# Patient Record
Sex: MALE | URBAN - METROPOLITAN AREA
[De-identification: names, ages, dates, MRNs, and addresses within clinical notes are randomized per-mention and may not be internally consistent; named-entity substitution may affect disease eponyms.]

---

## 2022-03-07 ENCOUNTER — EMERGENCY (EMERGENCY)
Facility: HOSPITAL | Age: 52
LOS: 1 days | Discharge: HOME | End: 2022-03-07
Attending: EMERGENCY MEDICINE
Payer: MEDICAID

## 2022-03-07 VITALS
HEIGHT: 68 IN | OXYGEN SATURATION: 98 % | WEIGHT: 199.96 LBS | RESPIRATION RATE: 18 BRPM | SYSTOLIC BLOOD PRESSURE: 168 MMHG | TEMPERATURE: 98 F | HEART RATE: 62 BPM | DIASTOLIC BLOOD PRESSURE: 79 MMHG

## 2022-03-07 VITALS
DIASTOLIC BLOOD PRESSURE: 77 MMHG | SYSTOLIC BLOOD PRESSURE: 157 MMHG | HEART RATE: 68 BPM | OXYGEN SATURATION: 98 % | TEMPERATURE: 98 F | RESPIRATION RATE: 18 BRPM

## 2022-03-07 LAB
HCT VFR BLD CALC: 41.4 % — SIGNIFICANT CHANGE UP (ref 39–50)
HGB BLD-MCNC: 14.3 G/DL — SIGNIFICANT CHANGE UP (ref 13–17)
LIDOCAIN IGE QN: 65 U/L — HIGH (ref 7–60)
MCHC RBC-ENTMCNC: 30.8 PG — SIGNIFICANT CHANGE UP (ref 27–34)
MCHC RBC-ENTMCNC: 34.5 GM/DL — SIGNIFICANT CHANGE UP (ref 32–36)
MCV RBC AUTO: 89 FL — SIGNIFICANT CHANGE UP (ref 80–100)
NRBC # BLD: 0 /100 WBCS — SIGNIFICANT CHANGE UP (ref 0–0)
PLATELET # BLD AUTO: 429 K/UL — HIGH (ref 150–400)
RBC # BLD: 4.65 M/UL — SIGNIFICANT CHANGE UP (ref 4.2–5.8)
RBC # FLD: 13 % — SIGNIFICANT CHANGE UP (ref 10.3–14.5)
WBC # BLD: 8.26 K/UL — SIGNIFICANT CHANGE UP (ref 3.8–10.5)
WBC # FLD AUTO: 8.26 K/UL — SIGNIFICANT CHANGE UP (ref 3.8–10.5)

## 2022-03-07 PROCEDURE — 76705 ECHO EXAM OF ABDOMEN: CPT | Mod: 26,RT

## 2022-03-07 PROCEDURE — 99284 EMERGENCY DEPT VISIT MOD MDM: CPT

## 2022-03-07 RX ORDER — ONDANSETRON 8 MG/1
4 TABLET, FILM COATED ORAL ONCE
Refills: 0 | Status: DISCONTINUED | OUTPATIENT
Start: 2022-03-07 | End: 2022-03-11

## 2022-03-07 NOTE — ED PROVIDER NOTE - NSFOLLOWUPCLINICS_GEN_ALL_ED_FT
[facility]  General Surgery  [address] [place] [address] - 3rd [place] [address]  Phone: [phone]  Fax:

## 2022-03-07 NOTE — ED ADULT NURSE NOTE - NSIMPLEMENTINTERV_GEN_ALL_ED
Implemented All Universal Safety Interventions:  [place] to call system. Call bell, personal items and telephone within reach. Instruct patient to call for assistance. Room bathroom lighting operational. Non-slip footwear when patient is off stretcher. Physically safe environment: no spills, clutter or unnecessary equipment. Stretcher in lowest position, wheels locked, appropriate side rails in place.

## 2022-03-07 NOTE — ED PROVIDER NOTE - ATTENDING CONTRIBUTION TO CARE
attending Abimbola: 51yM h/o cholelithiasis presents with worsening RUQ pain today. Reports having had outpatient MRI this week showing gallstones. Nausea without vomiting, chills without fever. Exam as above. Will obtain labs including lipase, US eval gallbladder, symptomatic treatment, reassess.

## 2022-03-07 NOTE — ED PROVIDER NOTE - RAPID ASSESSMENT
51 M w/ PMHx of gallbladder stones c/o abdominal pain, nausea, and chills. NKDA.    **Pt seen in the waiting room via teletriage by Roberto Nazario (MD), documentation completed by Hebert Florence. Pt to be sent to main ED for further evaluation - all orders placed to be followed by MD in the main ED**  Scribe Statement: Jazmyne WOLF Cole (scribe), attest that this documentation has been prepared under the direction and in the presence of Roberto Nazario (MD) 51 M w/ PMHx of gallbladder stones c/o abdominal pain, nausea, and chills. NKDA.    **Pt seen in the waiting room via teletriage by Roberto Nazario (MD), documentation completed by Jo Ann -Hebert Samano. Pt to be sent to main ED for further evaluation - all orders placed to be followed by MD in the main ED**  Scribe Statement: I, Hebert Samano (scribe), attest that this documentation has been prepared under the direction and in the presence of Roberto Nazario (MD)      Attending Note --     I saw the patient waiting area via televideo connection; a brief history was taken and a thorough physical exam was not performed as there is no physical exam room available.  The patient will be seen and further worked up in the main emergency department and their care will be completed by the main emergency department team.  I was not involved in this patient's care during the QDOC process, and unless otherwise noted in the ED provider note, was not involved in their care during their ED course.    The yohannesibe's documentation has been prepared under my direction and personally reviewed by me in its entirety. I confirm that the note above accurately reflects all work, treatment, procedures, and medical decision making performed by me  Dr. Nazario

## 2022-03-07 NOTE — ED PROVIDER NOTE - OBJECTIVE STATEMENT
PRINCIPAL DISCHARGE DIAGNOSIS  Diagnosis: Dizziness  Assessment and Plan of Treatment: You had a Cat scan of your head which did not show anything abnormal. Please see Dr. Abarca within 1 week for further follow-up.   If you continue to have symptoms, please call your doctor right away.      SECONDARY DISCHARGE DIAGNOSES  Diagnosis: Essential hypertension  Assessment and Plan of Treatment: Essential hypertension  - Continue current medications that you are taking at home.   - Monitor your blood pressure at home.
51M pmh cholelithiasis presnts to ED for RUQ pain radiating to R chest, symptoms have been going on for approx 4 weeks, pt recently had outpt MRI a few days ago which showed cholelithiasis, pt endoresed some nausea no vomiting, no fevers, abd pain improving, pt states symptoms have significantly approved, pt not currently nauseous or in any pain.

## 2022-03-07 NOTE — ED PROVIDER NOTE - PATIENT PORTAL LINK FT
You can access the FollowMyHealth Patient Portal offered by [facility] by registering at the following website: [email or web address]. By joining [employer]’s FollowMyHealth portal, you will also be able to view your health information using other applications (apps) compatible with our system.

## 2022-03-07 NOTE — ED ADULT NURSE NOTE - OBJECTIVE STATEMENT
52yo M with PMH recent gallstone dx, presents to ED with worsening abdominal pain. Patient states, "I saw my Dr. and was diagnosed with multiple gallstones, he wants to schedule a procedure on 3/21, but the pain is getting worse so I came to the ER". Endorses RUQ pain, denies taking anything for the pain. Also notes nausea associated with the pain and episodes of diarrhea. Denies chest pain, SOB, vomiting, bloody stools, constipation, hematuria, dysuria, urinary frequency, fevers/chills. A&Ox4. Strong peripheral pulses. Abdomen soft, nondistended, nontender to palpation. Negative CVA tenderness. Ambulatory with steady gait in ED. Stretcher in lowest position, side rails up. Patient instructed to notify RN if assistance is needed.

## 2022-03-07 NOTE — ED PROVIDER NOTE - NSFOLLOWUPINSTRUCTIONS_ED_ALL_ED_FT
You were seen in the Emergency Department for abdominal pain.    Follow up with your primary care provider within 3-5 days.    Follow up with a surgeon within the week, their information has been provided for you.    You may take 650 mg Tylenol (acetaminophen) every eight hours as needed for pain.    You may take 600mg Ibuprofen (Advil) once every 8 hours as needed for pain. See medication label for warnings and use instructions.    If you have worsening symptoms, fever, chills, nausea, vomiting, new or worsening pain, or if you have any new symptoms return to the Emergency Department.

## 2022-03-07 NOTE — ED PROVIDER NOTE - CLINICAL SUMMARY MEDICAL DECISION MAKING FREE TEXT BOX
Likely cholelithiasis, low concern for cholecystitis, not concerned for cholangitis, low concern nephrolithiasis ua r/o stone, symptomatic control, likely DC w. output followup.

## 2022-03-07 NOTE — ED PROVIDER NOTE - PROGRESS NOTE DETAILS
attending Abimbola: pt made aware of finding of possible hemangioma on US. Reports having had outpatient MRI several days ago and was unaware of any liver findings on that study. Pt aware he needs to have CT imaging performed outpatient.

## 2022-03-07 NOTE — ED PROVIDER NOTE - PHYSICAL EXAMINATION
CONSTITUTIONAL: well-appearing, in NAD  SKIN: Warm dry, normal skin turgor  HEAD: NCAT  NECK: Supple; non tender. Full ROM.  CARD: RRR, no murmurs.  RESP: clear to ausculation b/l. No crackles or wheezing.  ABD: soft, non-tender, non-distended, no rebound or guarding, neg murphys, no cva tenderness  MSK: No pedal edema, no calf tenderness  PSYCH: Cooperative, appropriate.

## 2022-03-08 LAB
ALBUMIN SERPL ELPH-MCNC: 4.2 G/DL — SIGNIFICANT CHANGE UP (ref 3.3–5)
ALP SERPL-CCNC: 120 U/L — SIGNIFICANT CHANGE UP (ref 40–120)
ALT FLD-CCNC: 36 U/L — SIGNIFICANT CHANGE UP (ref 10–45)
ANION GAP SERPL CALC-SCNC: 16 MMOL/L — SIGNIFICANT CHANGE UP (ref 5–17)
APPEARANCE UR: CLEAR — SIGNIFICANT CHANGE UP
AST SERPL-CCNC: 18 U/L — SIGNIFICANT CHANGE UP (ref 10–40)
BILIRUB SERPL-MCNC: 0.4 MG/DL — SIGNIFICANT CHANGE UP (ref 0.2–1.2)
BILIRUB UR-MCNC: NEGATIVE — SIGNIFICANT CHANGE UP
BUN SERPL-MCNC: 11 MG/DL — SIGNIFICANT CHANGE UP (ref 7–23)
CALCIUM SERPL-MCNC: 9.1 MG/DL — SIGNIFICANT CHANGE UP (ref 8.4–10.5)
CHLORIDE SERPL-SCNC: 103 MMOL/L — SIGNIFICANT CHANGE UP (ref 96–108)
CO2 SERPL-SCNC: 20 MMOL/L — LOW (ref 22–31)
COLOR SPEC: COLORLESS — SIGNIFICANT CHANGE UP
CREAT SERPL-MCNC: 1.02 MG/DL — SIGNIFICANT CHANGE UP (ref 0.5–1.3)
DIFF PNL FLD: NEGATIVE — SIGNIFICANT CHANGE UP
EGFR: 89 ML/MIN/1.73M2 — SIGNIFICANT CHANGE UP
GLUCOSE SERPL-MCNC: 120 MG/DL — HIGH (ref 70–99)
GLUCOSE UR QL: NEGATIVE — SIGNIFICANT CHANGE UP
KETONES UR-MCNC: ABNORMAL
LEUKOCYTE ESTERASE UR-ACNC: NEGATIVE — SIGNIFICANT CHANGE UP
NITRITE UR-MCNC: NEGATIVE — SIGNIFICANT CHANGE UP
PH UR: 6 — SIGNIFICANT CHANGE UP (ref 5–8)
POTASSIUM SERPL-MCNC: 3.7 MMOL/L — SIGNIFICANT CHANGE UP (ref 3.5–5.3)
POTASSIUM SERPL-SCNC: 3.7 MMOL/L — SIGNIFICANT CHANGE UP (ref 3.5–5.3)
PROT SERPL-MCNC: 7.1 G/DL — SIGNIFICANT CHANGE UP (ref 6–8.3)
PROT UR-MCNC: NEGATIVE — SIGNIFICANT CHANGE UP
SODIUM SERPL-SCNC: 138 MMOL/L — SIGNIFICANT CHANGE UP (ref 135–145)
SP GR SPEC: 1 — LOW (ref 1.01–1.02)
UROBILINOGEN FLD QL: NEGATIVE — SIGNIFICANT CHANGE UP

## 2022-03-08 PROCEDURE — 83690 ASSAY OF LIPASE: CPT

## 2022-03-08 PROCEDURE — 80053 COMPREHEN METABOLIC PANEL: CPT

## 2022-03-08 PROCEDURE — 96374 THER/PROPH/DIAG INJ IV PUSH: CPT

## 2022-03-08 PROCEDURE — 81003 URINALYSIS AUTO W/O SCOPE: CPT

## 2022-03-08 PROCEDURE — 99284 EMERGENCY DEPT VISIT MOD MDM: CPT | Mod: 25

## 2022-03-08 PROCEDURE — 36415 COLL VENOUS BLD VENIPUNCTURE: CPT

## 2022-03-08 PROCEDURE — 76705 ECHO EXAM OF ABDOMEN: CPT

## 2022-03-08 PROCEDURE — 85027 COMPLETE CBC AUTOMATED: CPT

## 2022-03-08 RX ORDER — FAMOTIDINE 10 MG/ML
20 INJECTION INTRAVENOUS ONCE
Refills: 0 | Status: COMPLETED | OUTPATIENT
Start: 2022-03-08 | End: 2022-03-08

## 2022-03-08 RX ADMIN — Medication 30 MILLILITER(S): at 00:32

## 2022-03-08 RX ADMIN — FAMOTIDINE 20 MILLIGRAM(S): 10 INJECTION INTRAVENOUS at 00:32

## 2022-04-25 PROBLEM — Z00.00 ENCOUNTER FOR PREVENTIVE HEALTH EXAMINATION: Status: ACTIVE | Noted: 2022-04-25

## 2022-05-07 ENCOUNTER — INPATIENT (INPATIENT)
Facility: HOSPITAL | Age: 52
LOS: 5 days | Discharge: HOME | DRG: 418 | End: 2022-05-13
Attending: SURGERY | Admitting: SURGERY
Payer: COMMERCIAL

## 2022-05-07 VITALS
DIASTOLIC BLOOD PRESSURE: 92 MMHG | HEIGHT: 68 IN | OXYGEN SATURATION: 100 % | RESPIRATION RATE: 22 BRPM | WEIGHT: 175.05 LBS | TEMPERATURE: 97 F | HEART RATE: 67 BPM | SYSTOLIC BLOOD PRESSURE: 164 MMHG

## 2022-05-07 DIAGNOSIS — K81.9 CHOLECYSTITIS, UNSPECIFIED: ICD-10-CM

## 2022-05-07 LAB
ALBUMIN SERPL ELPH-MCNC: 4.6 G/DL — SIGNIFICANT CHANGE UP (ref 3.3–5)
ALP SERPL-CCNC: 186 U/L — HIGH (ref 40–120)
ALT FLD-CCNC: 583 U/L — HIGH (ref 10–45)
ANION GAP SERPL CALC-SCNC: 16 MMOL/L — SIGNIFICANT CHANGE UP (ref 5–17)
APTT BLD: 30.2 SEC — SIGNIFICANT CHANGE UP (ref 27.5–35.5)
AST SERPL-CCNC: 681 U/L — HIGH (ref 10–40)
BASOPHILS # BLD AUTO: 0.03 K/UL — SIGNIFICANT CHANGE UP (ref 0–0.2)
BASOPHILS NFR BLD AUTO: 0.4 % — SIGNIFICANT CHANGE UP (ref 0–2)
BILIRUB SERPL-MCNC: 2.4 MG/DL — HIGH (ref 0.2–1.2)
BLD GP AB SCN SERPL QL: NEGATIVE — SIGNIFICANT CHANGE UP
BUN SERPL-MCNC: 14 MG/DL — SIGNIFICANT CHANGE UP (ref 7–23)
CALCIUM SERPL-MCNC: 9.5 MG/DL — SIGNIFICANT CHANGE UP (ref 8.4–10.5)
CHLORIDE SERPL-SCNC: 100 MMOL/L — SIGNIFICANT CHANGE UP (ref 96–108)
CO2 SERPL-SCNC: 23 MMOL/L — SIGNIFICANT CHANGE UP (ref 22–31)
CREAT SERPL-MCNC: 0.96 MG/DL — SIGNIFICANT CHANGE UP (ref 0.5–1.3)
EGFR: 96 ML/MIN/1.73M2 — SIGNIFICANT CHANGE UP
EOSINOPHIL # BLD AUTO: 0 K/UL — SIGNIFICANT CHANGE UP (ref 0–0.5)
EOSINOPHIL NFR BLD AUTO: 0 % — SIGNIFICANT CHANGE UP (ref 0–6)
FLUAV AG NPH QL: SIGNIFICANT CHANGE UP
FLUBV AG NPH QL: SIGNIFICANT CHANGE UP
GAS PNL BLDV: SIGNIFICANT CHANGE UP
GLUCOSE SERPL-MCNC: 122 MG/DL — HIGH (ref 70–99)
HCT VFR BLD CALC: 41.9 % — SIGNIFICANT CHANGE UP (ref 39–50)
HGB BLD-MCNC: 14.2 G/DL — SIGNIFICANT CHANGE UP (ref 13–17)
IMM GRANULOCYTES NFR BLD AUTO: 0.2 % — SIGNIFICANT CHANGE UP (ref 0–1.5)
INR BLD: 1 RATIO — SIGNIFICANT CHANGE UP (ref 0.88–1.16)
LIDOCAIN IGE QN: 22 U/L — SIGNIFICANT CHANGE UP (ref 7–60)
LYMPHOCYTES # BLD AUTO: 1.1 K/UL — SIGNIFICANT CHANGE UP (ref 1–3.3)
LYMPHOCYTES # BLD AUTO: 13.6 % — SIGNIFICANT CHANGE UP (ref 13–44)
MCHC RBC-ENTMCNC: 30.6 PG — SIGNIFICANT CHANGE UP (ref 27–34)
MCHC RBC-ENTMCNC: 33.9 GM/DL — SIGNIFICANT CHANGE UP (ref 32–36)
MCV RBC AUTO: 90.3 FL — SIGNIFICANT CHANGE UP (ref 80–100)
MONOCYTES # BLD AUTO: 0.54 K/UL — SIGNIFICANT CHANGE UP (ref 0–0.9)
MONOCYTES NFR BLD AUTO: 6.7 % — SIGNIFICANT CHANGE UP (ref 2–14)
NEUTROPHILS # BLD AUTO: 6.37 K/UL — SIGNIFICANT CHANGE UP (ref 1.8–7.4)
NEUTROPHILS NFR BLD AUTO: 79.1 % — HIGH (ref 43–77)
NRBC # BLD: 0 /100 WBCS — SIGNIFICANT CHANGE UP (ref 0–0)
PLATELET # BLD AUTO: 239 K/UL — SIGNIFICANT CHANGE UP (ref 150–400)
POTASSIUM SERPL-MCNC: 3.9 MMOL/L — SIGNIFICANT CHANGE UP (ref 3.5–5.3)
POTASSIUM SERPL-SCNC: 3.9 MMOL/L — SIGNIFICANT CHANGE UP (ref 3.5–5.3)
PROT SERPL-MCNC: 7.5 G/DL — SIGNIFICANT CHANGE UP (ref 6–8.3)
PROTHROM AB SERPL-ACNC: 11.6 SEC — SIGNIFICANT CHANGE UP (ref 10.5–13.4)
RBC # BLD: 4.64 M/UL — SIGNIFICANT CHANGE UP (ref 4.2–5.8)
RBC # FLD: 13.6 % — SIGNIFICANT CHANGE UP (ref 10.3–14.5)
RH IG SCN BLD-IMP: POSITIVE — SIGNIFICANT CHANGE UP
RH IG SCN BLD-IMP: POSITIVE — SIGNIFICANT CHANGE UP
RSV RNA NPH QL NAA+NON-PROBE: SIGNIFICANT CHANGE UP
SARS-COV-2 RNA SPEC QL NAA+PROBE: SIGNIFICANT CHANGE UP
SODIUM SERPL-SCNC: 139 MMOL/L — SIGNIFICANT CHANGE UP (ref 135–145)
WBC # BLD: 8.06 K/UL — SIGNIFICANT CHANGE UP (ref 3.8–10.5)
WBC # FLD AUTO: 8.06 K/UL — SIGNIFICANT CHANGE UP (ref 3.8–10.5)

## 2022-05-07 PROCEDURE — 99284 EMERGENCY DEPT VISIT MOD MDM: CPT | Mod: 25

## 2022-05-07 PROCEDURE — 93010 ELECTROCARDIOGRAM REPORT: CPT

## 2022-05-07 PROCEDURE — 76705 ECHO EXAM OF ABDOMEN: CPT | Mod: 26

## 2022-05-07 RX ORDER — SODIUM CHLORIDE 9 MG/ML
1000 INJECTION, SOLUTION INTRAVENOUS ONCE
Refills: 0 | Status: COMPLETED | OUTPATIENT
Start: 2022-05-07 | End: 2022-05-07

## 2022-05-07 RX ORDER — SODIUM CHLORIDE 9 MG/ML
1000 INJECTION, SOLUTION INTRAVENOUS
Refills: 0 | Status: DISCONTINUED | OUTPATIENT
Start: 2022-05-07 | End: 2022-05-10

## 2022-05-07 RX ORDER — ERTAPENEM SODIUM 1 G/1
1000 INJECTION, POWDER, LYOPHILIZED, FOR SOLUTION INTRAMUSCULAR; INTRAVENOUS ONCE
Refills: 0 | Status: COMPLETED | OUTPATIENT
Start: 2022-05-07 | End: 2022-05-07

## 2022-05-07 RX ORDER — HYDROMORPHONE HYDROCHLORIDE 2 MG/ML
0.5 INJECTION INTRAMUSCULAR; INTRAVENOUS; SUBCUTANEOUS EVERY 6 HOURS
Refills: 0 | Status: DISCONTINUED | OUTPATIENT
Start: 2022-05-07 | End: 2022-05-10

## 2022-05-07 RX ORDER — ERTAPENEM SODIUM 1 G/1
INJECTION, POWDER, LYOPHILIZED, FOR SOLUTION INTRAMUSCULAR; INTRAVENOUS
Refills: 0 | Status: DISCONTINUED | OUTPATIENT
Start: 2022-05-07 | End: 2022-05-11

## 2022-05-07 RX ORDER — ERTAPENEM SODIUM 1 G/1
1000 INJECTION, POWDER, LYOPHILIZED, FOR SOLUTION INTRAMUSCULAR; INTRAVENOUS EVERY 24 HOURS
Refills: 0 | Status: DISCONTINUED | OUTPATIENT
Start: 2022-05-08 | End: 2022-05-11

## 2022-05-07 RX ORDER — MORPHINE SULFATE 50 MG/1
2 CAPSULE, EXTENDED RELEASE ORAL ONCE
Refills: 0 | Status: DISCONTINUED | OUTPATIENT
Start: 2022-05-07 | End: 2022-05-07

## 2022-05-07 RX ORDER — ENOXAPARIN SODIUM 100 MG/ML
40 INJECTION SUBCUTANEOUS EVERY 24 HOURS
Refills: 0 | Status: DISCONTINUED | OUTPATIENT
Start: 2022-05-07 | End: 2022-05-11

## 2022-05-07 RX ORDER — PIPERACILLIN AND TAZOBACTAM 4; .5 G/20ML; G/20ML
3.38 INJECTION, POWDER, LYOPHILIZED, FOR SOLUTION INTRAVENOUS ONCE
Refills: 0 | Status: COMPLETED | OUTPATIENT
Start: 2022-05-07 | End: 2022-05-07

## 2022-05-07 RX ORDER — SODIUM CHLORIDE 9 MG/ML
1000 INJECTION INTRAMUSCULAR; INTRAVENOUS; SUBCUTANEOUS ONCE
Refills: 0 | Status: COMPLETED | OUTPATIENT
Start: 2022-05-07 | End: 2022-05-07

## 2022-05-07 RX ORDER — MORPHINE SULFATE 50 MG/1
4 CAPSULE, EXTENDED RELEASE ORAL ONCE
Refills: 0 | Status: DISCONTINUED | OUTPATIENT
Start: 2022-05-07 | End: 2022-05-07

## 2022-05-07 RX ORDER — FAMOTIDINE 10 MG/ML
20 INJECTION INTRAVENOUS ONCE
Refills: 0 | Status: COMPLETED | OUTPATIENT
Start: 2022-05-07 | End: 2022-05-07

## 2022-05-07 RX ADMIN — PIPERACILLIN AND TAZOBACTAM 200 GRAM(S): 4; .5 INJECTION, POWDER, LYOPHILIZED, FOR SOLUTION INTRAVENOUS at 18:00

## 2022-05-07 RX ADMIN — ERTAPENEM SODIUM 120 MILLIGRAM(S): 1 INJECTION, POWDER, LYOPHILIZED, FOR SOLUTION INTRAMUSCULAR; INTRAVENOUS at 20:53

## 2022-05-07 RX ADMIN — SODIUM CHLORIDE 1000 MILLILITER(S): 9 INJECTION, SOLUTION INTRAVENOUS at 15:32

## 2022-05-07 RX ADMIN — SODIUM CHLORIDE 100 MILLILITER(S): 9 INJECTION, SOLUTION INTRAVENOUS at 21:26

## 2022-05-07 RX ADMIN — SODIUM CHLORIDE 1000 MILLILITER(S): 9 INJECTION INTRAMUSCULAR; INTRAVENOUS; SUBCUTANEOUS at 18:00

## 2022-05-07 RX ADMIN — FAMOTIDINE 20 MILLIGRAM(S): 10 INJECTION INTRAVENOUS at 15:32

## 2022-05-07 NOTE — ED ADULT NURSE REASSESSMENT NOTE - NS ED NURSE REASSESS COMMENT FT1
Report received from ANNE Pabon. Pt resting in stretcher, a&ox3, nad, breathing spontaneous and unlabored, able to move all extremities and follow commands, skin warm and appropriate color. Pt reports mild abdominal pain but denies pain medication at this time. Pt safety and comfort provided.

## 2022-05-07 NOTE — H&P ADULT - ASSESSMENT
51M presenting with clinical acute cholecystitis and choledocholithiasis.    PLAN:  - Admit to ACS under Dr. Weems.  - MRCP to assess for choledocholithiasis  - NPO/IVFs  - IV Abx w/ Ertapenem  - Pain control as needed  - AM labs including LFTs  - Possible GI consult in AM if patient with persistent transaminitis/hyperbilirubinemia and/or MRCP significant for choledocholithiasis    Discussed with attending surgeon Dr. Faustina Fabian, PGY-2   [facility]   Acute Care Surgery   [id number]

## 2022-05-07 NOTE — ED PROVIDER NOTE - OBJECTIVE STATEMENT
50 yo male w/ PMH cholelithiasis here with c/o constant, worsening RUQ abd pain x 1 week. Pain started at night when trying to sleep, has been contast with associated nausea, worse with moving and with PO intake. has been tolerating fruit, states pain has been worsening. Pt has been taking motrin and tylenol with no relief, tool tylenol this morning what caused him to vomit. No fevers, chills, diarrhea, constipation, diarrhea, cough, chest pain.

## 2022-05-07 NOTE — ED PROVIDER NOTE - PHYSICAL EXAMINATION
A&Ox3, NAD, well appearing  NCAT. PERRL, EOMI.  Neck supple, no LAD.   Lungs CTAB. No w/r/r  Cardiac +S1S2, RRR, No m/r/g.   Abd soft, + RUQ TTP, +BS, no rebound or guarding.   Extremities: cap refill <2, pulses in distal extremities 4+, no edema.   Skin without rash.   No focal Defecits

## 2022-05-07 NOTE — ED PROVIDER NOTE - NS ED ATTENDING STATEMENT MOD
This was a shared visit with the SHAYNA. I reviewed and verified the documentation and independently performed the documented:

## 2022-05-07 NOTE — ED ADULT NURSE REASSESSMENT NOTE - NS ED NURSE REASSESS COMMENT FT1
Pt declined morphine as ordered by provider and in EMAR. PA made aware. Pt educated to make RN aware if patient experiencing pain.

## 2022-05-07 NOTE — H&P ADULT - NSHPLABSRESULTS_GEN_ALL_CORE
LABS:                        14.2   8.06  )-----------( 239      ( 07 May 2022 15:57 )             41.9     07 May 2022 15:57    139    |  100    |  14     ----------------------------<  122    3.9     |  23     |  0.96     Ca    9.5        07 May 2022 15:57    TPro  7.5    /  Alb  4.6    /  TBili  2.4    /  DBili  x      /  AST  681    /  ALT  583    /  AlkPhos  186    07 May 2022 15:57    PT/INR - ( 07 May 2022 15:57 )   PT: 11.6 sec;   INR: 1.00 ratio    PTT - ( 07 May 2022 15:57 )  PTT:30.2 sec    LIVER FUNCTIONS - ( 07 May 2022 15:57 )  Alb: 4.6 g/dL / Pro: 7.5 g/dL / ALK PHOS: 186 U/L / ALT: 583 U/L / AST: 681 U/L / GGT: x           IMAGING:    US Abdomen Upper Quadrant Right (05.07.22 @ 16:50)    FINDINGS:  Liver: Mildly hyperechoic lesion in the left hepatic lobe measuring 1.7 x   1.2 x 1.6 cm.  Bile ducts: Mild biliary ductal dilatation. Common bile duct measures 11   mm.  Gallbladder: Cholelithiasis. Mild thickening of the gallbladder wall. No   pericholecystic fluid. Localized tenderness was elicited while scanning   over the gallbladder.  Pancreas: Poorly visualized.  Right kidney: 11.3 cm. No hydronephrosis.  Ascites: None.  IVC: Visualized portions are within normal limits.    IMPRESSION:  *  Cholelithiasis with additional findings concerning for acute   cholecystitis.  *  Biliary ductal dilatation. CBD measures up to 11 mm.  *  Indeterminate mildly hyperechoic lesion in the left hepatic lobe   measuring up to 1.7 cm. Lesion may be further evaluated with   contrast-enhanced abdominal MRI on a nonemergent basis.

## 2022-05-07 NOTE — ED PROVIDER NOTE - NS ED ROS FT
Constitutional: No fever or chills  CV: No chest pain  Resp: No SOB  GI: see hpi  : No dysuria, hematuria.

## 2022-05-07 NOTE — H&P ADULT - REASON FOR ADMISSION
Anticoagulation Episode Summary     Current INR goal:   2.0-3.0   TTR:   73.3 % (5.7 y)   Next INR check:   8/20/2019   INR from last check:      Weekly max warfarin dose:      Target end date:   Indefinite   INR check location:      Preferred lab:      Send INR reminders to:   [facility] (OPEN ENROLLMENT) [place]    Indications    Warfarin anticoagulation [Z79.01]  Atrial Fibrillation  paroxysmal [I48.91]  Long term (current) use of anticoagulants [Z79.01] [Z79.01]           Comments:   2/72019   >75  HTn  HL  CAD  endocarditis     had been on lovenox for procedure - check with Eduard if need it         Anticoagulation Care Providers     Provider Role Specialty Phone number    Gt Chapa MD Referring Internal Medicine [phone]      Patient declines any positive findings.     Notified patient of results/note as listed below.jose        
INR = 3.0   Reduce coumadin to 5 mg M, and 6 mg all other days.  Repeat INR in 2 weeks.   
Clinical acute cholecystitis w/ choledocholithiasis

## 2022-05-07 NOTE — ED PROVIDER NOTE - CLINICAL SUMMARY MEDICAL DECISION MAKING FREE TEXT BOX
ROBERT Dsouza MD:  52 yo male w/ PMH cholelithiasis here with c/o constant, worsening RUQ abd pain x 1 week. Plan for: RUQ US, basic labs, lipase, IVF, pain control, reassess

## 2022-05-07 NOTE — ED ADULT NURSE NOTE - OBJECTIVE STATEMENT
50 yo M w/ PMHx of cholelithiasis presents to ED via waiting room c/o RUQ pain. Pt reports pain similar to prior episode of cholelithiasis. Pt reports pain began this morning after eating. Took tylenol w/ minor relief of pain. Pt denies any CP, SOB, cough, N/V, fever, chills, urinary complaints, constipation, diarrhea, HA, dizziness, weakness. Pt A&Ox4, lungs CTA, +central pulses. Abdomen soft, not distended. Ambulating w/ steady gait, safety and comfort maintained, no acute distress noted at this time. Pt denies any recent travel or known sick contacts. 50 yo M w/ PMHx of cholelithiasis presents to ED via waiting room c/o RUQ pain. Pt reports pain similar to prior episode of cholelithiasis, pain persistent over past week. Pt reports pain worsened this morning after eating. Took tylenol w/ minor relief of pain. Pt denies any CP, SOB, cough, N/V, fever, chills, urinary complaints, constipation, diarrhea, HA, dizziness, weakness. Pt A&Ox4, lungs CTA, +central pulses. Abdomen soft, not distended. Ambulating w/ steady gait, safety and comfort maintained, no acute distress noted at this time. Pt denies any recent travel or known sick contacts.

## 2022-05-07 NOTE — H&P ADULT - ATTENDING COMMENTS
Pt seen and examined with ACS team, agree with above. Pt has h/o cholelithiasis and has cholecystectomy scheduled in June, but this most recent episode of pain has lasted a week and has been associated with nausea and vomiting. Has improved with pain medication.    VSS. Abd soft, nondistended, mild TTP RUQ (pt had recently received pain medication and stated he was feeling better).    Labs reviewed, notable for total bili 2.4.  US images personally reviewed, demonstrates cholelithiasis and dilation of the CBD.    A/P 52y/o man with h/o cholelithiasis presents with acute cholecystitis and possible choledocholithiasis:  - MRCP to evaluate for choledocholithiasis  - Acute mady diagnosed clinically with 1 week of pain - start antibiotics  - Will likely proceed to cholecystectomy during this admission given frequent recurrence of symptoms.  - NPO, IVF.

## 2022-05-07 NOTE — PATIENT PROFILE ADULT - DO YOU FEEL UNSAFE AT HOME, WORK, OR SCHOOL?
Pt called stating he has an appt 6-22-17  Pt is requesting a refill of      Disp Refills Start End      glimepiride (AMARYL) 2 MG tablet 90 tablet 1 10/26/2016     Sig - Route: Take 1 tablet by mouth daily (before breakfast).      [facility]  Per protocol medication refilled.     no

## 2022-05-07 NOTE — H&P ADULT - NSHPPHYSICALEXAM_GEN_ALL_CORE
VITAL SIGNS:  Vital Signs Last 24 Hrs  T(C): 37.3 (07 May 2022 15:16), Max: 37.3 (07 May 2022 13:50)  T(F): 99.1 (07 May 2022 15:16), Max: 99.2 (07 May 2022 13:50)  HR: 78 (07 May 2022 15:16) (67 - 78)  BP: 154/96 (07 May 2022 15:16) (151/82 - 164/92)  BP(mean): --  RR: 20 (07 May 2022 15:16) (20 - 22)  SpO2: 98% (07 May 2022 15:16) (98% - 100%)      PHYSICAL EXAM:    General: NAD, Sitting in bed comfortably  HEENT: NC/AT, EOMI  Neck: Soft, supple  Cardio: RRR, nml S1/S2  Resp: Good effort, CTA b/l  Thorax: No chest wall tenderness  Breast: No lesions/masses, no drainage  GI/Abd: Soft, RUQ tenderness, no rebound/guarding, no masses palpated  Musculoskeletal: All 4 extremities moving spontaneously, no limitations VITAL SIGNS:  Vital Signs Last 24 Hrs  T(C): 37.3 (07 May 2022 15:16), Max: 37.3 (07 May 2022 13:50)  T(F): 99.1 (07 May 2022 15:16), Max: 99.2 (07 May 2022 13:50)  HR: 78 (07 May 2022 15:16) (67 - 78)  BP: 154/96 (07 May 2022 15:16) (151/82 - 164/92)  BP(mean): --  RR: 20 (07 May 2022 15:16) (20 - 22)  SpO2: 98% (07 May 2022 15:16) (98% - 100%)      PHYSICAL EXAM:    General: NAD, Sitting in bed comfortably  HENT: NC/AT  Eyes: EOMI, no scleral icterus  Neck: Soft, supple  Cardio: RRR, nml S1/S2  Resp: Good effort, CTA b/l  Thorax: No chest wall tenderness  GI/Abd: Soft, RUQ tenderness, no rebound/guarding, no masses palpated  Musculoskeletal: All 4 extremities moving spontaneously, no limitations  Neuro: Moves all extremities, A&O x4  Psych: normal affect

## 2022-05-07 NOTE — H&P ADULT - HISTORY OF PRESENT ILLNESS
51M w/ PMH of cholelithiasis who presents with a 1 week history of RUQ pain w/ associated nausea/vomiting. Patient has had similar episode 3 months ago and had scheduled surgery in the summer, however pain recurred 1 week ago and persistent, thus prompting his ED visit.    In the ED, patient was afebrile, hemodynamically stable, labs remarkable for transaminitis and hyperbilirubinemia of T. bili 2.4, RUQ U/S revealed cholelithiasis and a dilated CBC to 1.1cm. Surgery consulted for evaluation.    Patient has never had a colonoscopy. 51M w/ PMH of cholelithiasis who presents with a 1 week history of moderate to severe RUQ pain w/ associated nausea/vomiting. Patient has had similar episode 3 months ago and had scheduled surgery in the summer, however pain recurred 1 week ago and was persistent, thus prompting his ED visit.    In the ED, patient was afebrile, hemodynamically stable, labs remarkable for transaminitis and hyperbilirubinemia of T. bili 2.4, RUQ U/S revealed cholelithiasis and a dilated CBC to 1.1cm. Surgery consulted for evaluation.    Patient has never had a colonoscopy.

## 2022-05-07 NOTE — ED PROVIDER NOTE - PROGRESS NOTE DETAILS
ap- pt signed out to me pending results of ultrasound, found to have cholecystitis, pt reevaluated states he has moderate pain, RUQ tenderness, zosyn ordered, additional ivf, Sx consulted spoke with surgery will come see pt. -Gabrielle Marinelli PA-C

## 2022-05-07 NOTE — PATIENT PROFILE ADULT - FALL HARM RISK - HARM RISK INTERVENTIONS

## 2022-05-08 DIAGNOSIS — Z98.890 OTHER SPECIFIED POSTPROCEDURAL STATES: Chronic | ICD-10-CM

## 2022-05-08 LAB
ALBUMIN SERPL ELPH-MCNC: 3.9 G/DL — SIGNIFICANT CHANGE UP (ref 3.3–5)
ALP SERPL-CCNC: 208 U/L — HIGH (ref 40–120)
ALT FLD-CCNC: 613 U/L — HIGH (ref 10–45)
ANION GAP SERPL CALC-SCNC: 14 MMOL/L — SIGNIFICANT CHANGE UP (ref 5–17)
AST SERPL-CCNC: 503 U/L — HIGH (ref 10–40)
BILIRUB SERPL-MCNC: 3.3 MG/DL — HIGH (ref 0.2–1.2)
BUN SERPL-MCNC: 9 MG/DL — SIGNIFICANT CHANGE UP (ref 7–23)
CALCIUM SERPL-MCNC: 9.1 MG/DL — SIGNIFICANT CHANGE UP (ref 8.4–10.5)
CHLORIDE SERPL-SCNC: 103 MMOL/L — SIGNIFICANT CHANGE UP (ref 96–108)
CO2 SERPL-SCNC: 23 MMOL/L — SIGNIFICANT CHANGE UP (ref 22–31)
CREAT SERPL-MCNC: 1.05 MG/DL — SIGNIFICANT CHANGE UP (ref 0.5–1.3)
EGFR: 86 ML/MIN/1.73M2 — SIGNIFICANT CHANGE UP
GLUCOSE SERPL-MCNC: 96 MG/DL — SIGNIFICANT CHANGE UP (ref 70–99)
HCT VFR BLD CALC: 41 % — SIGNIFICANT CHANGE UP (ref 39–50)
HGB BLD-MCNC: 13.5 G/DL — SIGNIFICANT CHANGE UP (ref 13–17)
MAGNESIUM SERPL-MCNC: 2 MG/DL — SIGNIFICANT CHANGE UP (ref 1.6–2.6)
MCHC RBC-ENTMCNC: 30.1 PG — SIGNIFICANT CHANGE UP (ref 27–34)
MCHC RBC-ENTMCNC: 32.9 GM/DL — SIGNIFICANT CHANGE UP (ref 32–36)
MCV RBC AUTO: 91.5 FL — SIGNIFICANT CHANGE UP (ref 80–100)
NRBC # BLD: 0 /100 WBCS — SIGNIFICANT CHANGE UP (ref 0–0)
PHOSPHATE SERPL-MCNC: 3.1 MG/DL — SIGNIFICANT CHANGE UP (ref 2.5–4.5)
PLATELET # BLD AUTO: 224 K/UL — SIGNIFICANT CHANGE UP (ref 150–400)
POTASSIUM SERPL-MCNC: 4.3 MMOL/L — SIGNIFICANT CHANGE UP (ref 3.5–5.3)
POTASSIUM SERPL-SCNC: 4.3 MMOL/L — SIGNIFICANT CHANGE UP (ref 3.5–5.3)
PROT SERPL-MCNC: 6.6 G/DL — SIGNIFICANT CHANGE UP (ref 6–8.3)
RBC # BLD: 4.48 M/UL — SIGNIFICANT CHANGE UP (ref 4.2–5.8)
RBC # FLD: 14.1 % — SIGNIFICANT CHANGE UP (ref 10.3–14.5)
SODIUM SERPL-SCNC: 140 MMOL/L — SIGNIFICANT CHANGE UP (ref 135–145)
WBC # BLD: 7.5 K/UL — SIGNIFICANT CHANGE UP (ref 3.8–10.5)
WBC # FLD AUTO: 7.5 K/UL — SIGNIFICANT CHANGE UP (ref 3.8–10.5)

## 2022-05-08 PROCEDURE — 99233 SBSQ HOSP IP/OBS HIGH 50: CPT | Mod: 57

## 2022-05-08 PROCEDURE — 74183 MRI ABD W/O CNTR FLWD CNTR: CPT | Mod: 26

## 2022-05-08 PROCEDURE — 99253 IP/OBS CNSLTJ NEW/EST LOW 45: CPT | Mod: GC

## 2022-05-08 RX ADMIN — SODIUM CHLORIDE 100 MILLILITER(S): 9 INJECTION, SOLUTION INTRAVENOUS at 04:41

## 2022-05-08 RX ADMIN — HYDROMORPHONE HYDROCHLORIDE 0.5 MILLIGRAM(S): 2 INJECTION INTRAMUSCULAR; INTRAVENOUS; SUBCUTANEOUS at 00:53

## 2022-05-08 RX ADMIN — ERTAPENEM SODIUM 120 MILLIGRAM(S): 1 INJECTION, POWDER, LYOPHILIZED, FOR SOLUTION INTRAMUSCULAR; INTRAVENOUS at 18:31

## 2022-05-08 RX ADMIN — SODIUM CHLORIDE 100 MILLILITER(S): 9 INJECTION, SOLUTION INTRAVENOUS at 08:08

## 2022-05-08 RX ADMIN — HYDROMORPHONE HYDROCHLORIDE 0.5 MILLIGRAM(S): 2 INJECTION INTRAMUSCULAR; INTRAVENOUS; SUBCUTANEOUS at 00:23

## 2022-05-08 RX ADMIN — ENOXAPARIN SODIUM 40 MILLIGRAM(S): 100 INJECTION SUBCUTANEOUS at 04:41

## 2022-05-08 NOTE — PROGRESS NOTE ADULT - ATTENDING COMMENTS
seen and examined 05-08-22 @ 1410    afeb  AVSS  soft / NT / ND  no surgical scars on abdomen  +jaundice    WBC = 7  T bili - 2.4 -> 3.3    RUQ U/S - cholelithiasis with mild gallbladder wall thickening. CBD = 11 mm. (I reviewed images)    cholelithiasis with choledocholithiasis  -MRCP to confirm choledocholithiasis  -laparoscopic cholecystectomy after ERCP seen and examined 05-08-22 @ 1410    afeb  AVSS  soft / NT / ND  no surgical scars on abdomen  +jaundice    WBC = 7  T bili - 2.4 -> 3.3    RUQ U/S - cholelithiasis with mild gallbladder wall thickening. CBD = 11 mm. (I reviewed images)    cholelithiasis with choledocholithiasis  -MRCP to confirm choledocholithiasis  -laparoscopic cholecystectomy after ERCP  -Dilaudid IV PRN pain

## 2022-05-08 NOTE — CONSULT NOTE ADULT - SUBJECTIVE AND OBJECTIVE BOX
Chief Complaint:  Patient is a 51y old  Male who presents with a chief complaint of Clinical acute cholecystitis w/ choledocholithiasis (08 May 2022 17:36)      HPI:KENNEDY MARCIAL is a 51y Male with h/o cholelithiasis who initially presented with a 1 week history of moderate to severe RUQ pain w/ associated nausea/NBNB vomiting. Patient has had similar episode 3 months ago and had scheduled surgery in the summer, however pain recurred 1 week ago and was persistent, thus prompting his ED visit. Denying f/c, diarrhea, constipation. No alcohol use, IVDU, smoking. He's been afebrile inhouse and HDS. No white count. With uptrending bili to 3.3 from 2.4, and initial  now 208,  and . US 5/7 notable for mild biliary ductal dilatation, CBD 11 mm with cholelithiasis and mild thickening of the GB wall, rollins's sign w/o pericholecystic fluid. The liver was also noted to have an indeterminate hyperechoic lesion 1.7x1.2x1.6 cm. He was made NPO and started on abx. Surgery on board, and per patient, plans for CCY inpatient after evaluation of choledocholithiasis. Patient w/o abdominal pain and no n/v at this time.     PMHX/PSHX:  Cholelithiasis    H/O knee surgery      Allergies:  No Known Allergies      Home Medications: reviewed  Hospital Medications:  enoxaparin Injectable 40 milliGRAM(s) SubCutaneous every 24 hours  ertapenem  IVPB      ertapenem  IVPB 1000 milliGRAM(s) IV Intermittent every 24 hours  HYDROmorphone  Injectable 0.5 milliGRAM(s) IV Push every 6 hours PRN  lactated ringers. 1000 milliLiter(s) IV Continuous <Continuous>      Social History:   Tob: Denies  EtOH: Denies  Illicit Drugs: Denies    Family history:  No pertinent family history in first degree relatives      ROS:   Complete and normal except as mentioned above.    PHYSICAL EXAM:   Vital Signs:  Vital Signs Last 24 Hrs  T(C): 36.8 (08 May 2022 20:40), Max: 37.1 (08 May 2022 13:54)  T(F): 98.2 (08 May 2022 20:40), Max: 98.7 (08 May 2022 13:54)  HR: 63 (08 May 2022 20:40) (55 - 67)  BP: 117/63 (08 May 2022 20:40) (111/56 - 152/75)  BP(mean): --  RR: 18 (08 May 2022 20:40) (16 - 18)  SpO2: 100% (08 May 2022 20:40) (97% - 100%)  Daily     Daily     GENERAL: no acute distress  NEURO: alert  HEENT: scleral icterus  CHEST: no respiratory distress, no accessory muscle use  CARDIAC: regular rate, rhythm  ABDOMEN: soft, non-tender, non-distended, no rebound or guarding  EXTREMITIES: warm, well perfused, no edema  SKIN: jaundice     LABS: reviewed                        13.5   7.50  )-----------( 224      ( 08 May 2022 07:49 )             41.0     05-08    140  |  103  |  9   ----------------------------<  96  4.3   |  23  |  1.05    Ca    9.1      08 May 2022 07:36  Phos  3.1     05-08  Mg     2.0     05-08    TPro  6.6  /  Alb  3.9  /  TBili  3.3<H>  /  DBili  x   /  AST  503<H>  /  ALT  613<H>  /  AlkPhos  208<H>  05-08    LIVER FUNCTIONS - ( 08 May 2022 07:36 )  Alb: 3.9 g/dL / Pro: 6.6 g/dL / ALK PHOS: 208 U/L / ALT: 613 U/L / AST: 503 U/L / GGT: x               Diagnostic Studies: see sunrise for full report         Chief Complaint:  Patient is a 51y old  Male who presents with a chief complaint of Clinical acute cholecystitis w/ choledocholithiasis (08 May 2022 17:36)      HPI:KENNEDY MARCIAL is a 51y Male with h/o cholelithiasis who initially presented with a 1 week history of moderate to severe RUQ pain w/ associated nausea/NBNB vomiting. Patient has had similar episode 3 months ago and had scheduled surgery in the summer, however pain recurred 1 week ago and was persistent, thus prompting his ED visit. Denying f/c, diarrhea, constipation. No alcohol use, IVDU, smoking. He's been afebrile inhouse and HDS. No white count. With uptrending bili to 3.3 from 2.4, and initial  now 208,  and . US 5/7 notable for mild biliary ductal dilatation, CBD 11 mm with cholelithiasis and mild thickening of the GB wall, rollins's sign w/o pericholecystic fluid. The liver was also noted to have an indeterminate hyperechoic lesion 1.7x1.2x1.6 cm. He was made NPO and started on abx. Surgery on board, and per patient, plans for CCY inpatient after evaluation of choledocholithiasis. Patient w/o abdominal pain and no n/v at this time.     PMHX/PSHX:  Cholelithiasis    H/O knee surgery      Allergies:  No Known Allergies      Home Medications: reviewed  Hospital Medications:  enoxaparin Injectable 40 milliGRAM(s) SubCutaneous every 24 hours  ertapenem  IVPB      ertapenem  IVPB 1000 milliGRAM(s) IV Intermittent every 24 hours  HYDROmorphone  Injectable 0.5 milliGRAM(s) IV Push every 6 hours PRN  lactated ringers. 1000 milliLiter(s) IV Continuous <Continuous>      Social History:   Tob: Denies  EtOH: Denies  Illicit Drugs: Denies    Family history:  No pertinent family history in first degree relatives      ROS:   Complete and normal except as mentioned above.    PHYSICAL EXAM:   Vital Signs:  Vital Signs Last 24 Hrs  T(C): 36.8 (08 May 2022 20:40), Max: 37.1 (08 May 2022 13:54)  T(F): 98.2 (08 May 2022 20:40), Max: 98.7 (08 May 2022 13:54)  HR: 63 (08 May 2022 20:40) (55 - 67)  BP: 117/63 (08 May 2022 20:40) (111/56 - 152/75)  BP(mean): --  RR: 18 (08 May 2022 20:40) (16 - 18)  SpO2: 100% (08 May 2022 20:40) (97% - 100%)  Daily     Daily     GENERAL: no acute distress  NEURO: alert  HEENT: scleral icterus  CHEST: no respiratory distress, no accessory muscle use  CARDIAC: regular rate, rhythm  ABDOMEN: soft, non-tender, non-distended, no rebound or guarding  EXTREMITIES: warm, well perfused, no edema  SKIN: jaundice     LABS: reviewed                            13.5   7.50  )-----------( 224      ( 08 May 2022 07:49 )             41.0     05-08    140  |  103  |  9   ----------------------------<  96  4.3   |  23  |  1.05    Ca    9.1      08 May 2022 07:36  Phos  3.1     05-08  Mg     2.0     05-08    TPro  6.6  /  Alb  3.9  /  TBili  3.3<H>  /  DBili  x   /  AST  503<H>  /  ALT  613<H>  /  AlkPhos  208<H>  05-08    LIVER FUNCTIONS - ( 08 May 2022 07:36 )  Alb: 3.9 g/dL / Pro: 6.6 g/dL / ALK PHOS: 208 U/L / ALT: 613 U/L / AST: 503 U/L / GGT: x               Diagnostic Studies: see sunrise for full report

## 2022-05-08 NOTE — CONSULT NOTE ADULT - ATTENDING COMMENTS
As above  51M with RUQ elevated liver tests, US evidence of cholecystitis and bile duct dilation  Given elevated liver tests likely had CBD stone however recommend MRCP to assess if still present as pain has resolved  If MRCP positive will plan for ERCP for stone removal  Surgery planning for subsequent CCY    Thank you for this interesting consult.  Please call the advanced GI service with any questions or concerns.

## 2022-05-08 NOTE — PROGRESS NOTE ADULT - SUBJECTIVE AND OBJECTIVE BOX
TEAM Surgery Progress Note    INTERVAL EVENTS: No acute events overnight.  SUBJECTIVE: Patient seen and examined at bedside with surgical team    OBJECTIVE:    Vital Signs Last 24 Hrs  T(C): 36.8 (08 May 2022 01:00), Max: 37.3 (07 May 2022 13:50)  T(F): 98.2 (08 May 2022 01:00), Max: 99.2 (07 May 2022 13:50)  HR: 56 (08 May 2022 01:00) (56 - 78)  BP: 111/56 (08 May 2022 01:00) (111/56 - 164/92)  BP(mean): --  RR: 18 (08 May 2022 01:00) (16 - 22)  SpO2: 98% (08 May 2022 01:00) (98% - 100%)I&O's Detail  MEDICATIONS  (STANDING):  enoxaparin Injectable 40 milliGRAM(s) SubCutaneous every 24 hours  ertapenem  IVPB      ertapenem  IVPB 1000 milliGRAM(s) IV Intermittent every 24 hours  lactated ringers. 1000 milliLiter(s) (100 mL/Hr) IV Continuous <Continuous>    MEDICATIONS  (PRN):  HYDROmorphone  Injectable 0.5 milliGRAM(s) IV Push every 6 hours PRN Moderate Pain (4 - 6)    PHYSICAL EXAM:    General: NAD, Sitting in bed comfortably  HEENT: NC/AT, EOMI  Neck: Soft, supple  Cardio: RRR, nml S1/S2  Resp: Good effort, CTA b/l  Thorax: No chest wall tenderness  Breast: No lesions/masses, no drainage  GI/Abd: Soft, RUQ tenderness, no rebound/guarding, no masses palpated  Musculoskeletal: All 4 extremities moving spontaneously, no limitations      LABS:                        14.2   8.06  )-----------( 239      ( 07 May 2022 15:57 )             41.9     05-07    139  |  100  |  14  ----------------------------<  122<H>  3.9   |  23  |  0.96    Ca    9.5      07 May 2022 15:57    TPro  7.5  /  Alb  4.6  /  TBili  2.4<H>  /  DBili  x   /  AST  681<H>  /  ALT  583<H>  /  AlkPhos  186<H>  05-07    PT/INR - ( 07 May 2022 15:57 )   PT: 11.6 sec;   INR: 1.00 ratio         PTT - ( 07 May 2022 15:57 )  PTT:30.2 sec  LIVER FUNCTIONS - ( 07 May 2022 15:57 )  Alb: 4.6 g/dL / Pro: 7.5 g/dL / ALK PHOS: 186 U/L / ALT: 583 U/L / AST: 681 U/L / GGT: x             ABO Interpretation: O (05-07-22 @ 18:07)  ABO Interpretation: O (05-07-22 @ 17:36)      IMAGING:     TEAM Surgery Progress Note    INTERVAL EVENTS: No acute events overnight.  SUBJECTIVE: Patient seen and examined at bedside with surgical team, no complaints.    OBJECTIVE:    Vital Signs Last 24 Hrs  T(C): 36.8 (08 May 2022 01:00), Max: 37.3 (07 May 2022 13:50)  T(F): 98.2 (08 May 2022 01:00), Max: 99.2 (07 May 2022 13:50)  HR: 56 (08 May 2022 01:00) (56 - 78)  BP: 111/56 (08 May 2022 01:00) (111/56 - 164/92)  BP(mean): --  RR: 18 (08 May 2022 01:00) (16 - 22)  SpO2: 98% (08 May 2022 01:00) (98% - 100%)I&O's Detail  MEDICATIONS  (STANDING):  enoxaparin Injectable 40 milliGRAM(s) SubCutaneous every 24 hours  ertapenem  IVPB      ertapenem  IVPB 1000 milliGRAM(s) IV Intermittent every 24 hours  lactated ringers. 1000 milliLiter(s) (100 mL/Hr) IV Continuous <Continuous>    MEDICATIONS  (PRN):  HYDROmorphone  Injectable 0.5 milliGRAM(s) IV Push every 6 hours PRN Moderate Pain (4 - 6)    PHYSICAL EXAM:    General: NAD, Sitting in bed comfortably  HEENT: NC/AT, EOMI  Neck: Soft, supple  Cardio: regular rate  Resp: unlabored respirations  Thorax: No chest wall tenderness  Breast: No lesions/masses, no drainage  GI/Abd: Soft, RUQ tenderness, no rebound/guarding, no masses palpated  Musculoskeletal: All 4 extremities moving spontaneously, no limitations    LABS:                        14.2   8.06  )-----------( 239      ( 07 May 2022 15:57 )             41.9     05-07    139  |  100  |  14  ----------------------------<  122<H>  3.9   |  23  |  0.96    Ca    9.5      07 May 2022 15:57    TPro  7.5  /  Alb  4.6  /  TBili  2.4<H>  /  DBili  x   /  AST  681<H>  /  ALT  583<H>  /  AlkPhos  186<H>  05-07    PT/INR - ( 07 May 2022 15:57 )   PT: 11.6 sec;   INR: 1.00 ratio         PTT - ( 07 May 2022 15:57 )  PTT:30.2 sec  LIVER FUNCTIONS - ( 07 May 2022 15:57 )  Alb: 4.6 g/dL / Pro: 7.5 g/dL / ALK PHOS: 186 U/L / ALT: 583 U/L / AST: 681 U/L / GGT: x             ABO Interpretation: O (05-07-22 @ 18:07)  ABO Interpretation: O (05-07-22 @ 17:36)      IMAGING:

## 2022-05-08 NOTE — PROGRESS NOTE ADULT - ASSESSMENT
· Assessment	  51M presenting with clinical acute cholecystitis and choledocholithiasis.    PLAN:  - MRCP to assess for choledocholithiasis  - NPO/IVFs  - IV Abx w/ Ertapenem  - Pain control as needed  - FU AM labs  - Possible GI consult 5/8 if patient with persistent transaminitis/hyperbilirubinemia and/or MRCP significant for choledocholithiasis     · Assessment	  51M presenting with clinical acute cholecystitis and choledocholithiasis.    PLAN:  - MRCP to assess for choledocholithiasis  - NPO/IVFs  - IV Abx w/ Ertapenem  - Pain control as needed  - FU AM labs  - GI consult 5/8 if patient with persistent transaminitis/hyperbilirubinemia and/or MRCP significant for choledocholithiasis    ACS, [id number]

## 2022-05-08 NOTE — CONSULT NOTE ADULT - ASSESSMENT
KENNEDY MARCIAL is a 51y Male with h/o cholelithiasis who initially presented with a 1 week history of moderate to severe RUQ pain w/ associated nausea/NBNB vomiting. Patient has had similar episode 3 months ago and had scheduled surgery in the summer, however pain recurred 1 week ago and was persistent, thus prompting his ED visit.     # Choledocholithiasis with CBD 11 mm  # Acute cholecystitis    Recommendations:  - Keep NPO after midnight  - Check MRCP, if positive, plan for ERCP  - IV abx  - Surgery on board, plans for CCY  - Daily CBC, CMP, coags  - Monitor fever curve and for changes in mental status  - Ensure adequate hydration  - Rest of care per primary    Preliminary note until signed by Attending.    Thank you for involving us in this patient's care.      Moraima Murray MD  Gastroenterology/Hepatology Fellow, PGY-V  Weekend Coverage    NON-URGENT CONSULTS:  Please email [email or web address]  AT NIGHT AND ON WEEKENDS:  Contact on-call GI fellow via answering service ([phone]) from 5pm-8am and on weekends/holidays  MONDAY-FRIDAY 8AM-5PM:  Pager# [phone] ([facility])  GI Phone# [phone] ([facility])

## 2022-05-09 ENCOUNTER — RESULT REVIEW (OUTPATIENT)
Age: 52
End: 2022-05-09

## 2022-05-09 LAB
ALBUMIN SERPL ELPH-MCNC: 3.9 G/DL — SIGNIFICANT CHANGE UP (ref 3.3–5)
ALP SERPL-CCNC: 275 U/L — HIGH (ref 40–120)
ALT FLD-CCNC: 533 U/L — HIGH (ref 10–45)
ANION GAP SERPL CALC-SCNC: 15 MMOL/L — SIGNIFICANT CHANGE UP (ref 5–17)
APTT BLD: 30.9 SEC — SIGNIFICANT CHANGE UP (ref 27.5–35.5)
AST SERPL-CCNC: 261 U/L — HIGH (ref 10–40)
BILIRUB SERPL-MCNC: 4.7 MG/DL — HIGH (ref 0.2–1.2)
BLD GP AB SCN SERPL QL: NEGATIVE — SIGNIFICANT CHANGE UP
BUN SERPL-MCNC: 8 MG/DL — SIGNIFICANT CHANGE UP (ref 7–23)
CALCIUM SERPL-MCNC: 9.3 MG/DL — SIGNIFICANT CHANGE UP (ref 8.4–10.5)
CHLORIDE SERPL-SCNC: 100 MMOL/L — SIGNIFICANT CHANGE UP (ref 96–108)
CO2 SERPL-SCNC: 23 MMOL/L — SIGNIFICANT CHANGE UP (ref 22–31)
CREAT SERPL-MCNC: 1.02 MG/DL — SIGNIFICANT CHANGE UP (ref 0.5–1.3)
EGFR: 89 ML/MIN/1.73M2 — SIGNIFICANT CHANGE UP
GLUCOSE SERPL-MCNC: 94 MG/DL — SIGNIFICANT CHANGE UP (ref 70–99)
HCT VFR BLD CALC: 42.4 % — SIGNIFICANT CHANGE UP (ref 39–50)
HGB BLD-MCNC: 14.1 G/DL — SIGNIFICANT CHANGE UP (ref 13–17)
INR BLD: 1.02 RATIO — SIGNIFICANT CHANGE UP (ref 0.88–1.16)
MAGNESIUM SERPL-MCNC: 2 MG/DL — SIGNIFICANT CHANGE UP (ref 1.6–2.6)
MCHC RBC-ENTMCNC: 30.5 PG — SIGNIFICANT CHANGE UP (ref 27–34)
MCHC RBC-ENTMCNC: 33.3 GM/DL — SIGNIFICANT CHANGE UP (ref 32–36)
MCV RBC AUTO: 91.8 FL — SIGNIFICANT CHANGE UP (ref 80–100)
NRBC # BLD: 0 /100 WBCS — SIGNIFICANT CHANGE UP (ref 0–0)
PHOSPHATE SERPL-MCNC: 3.8 MG/DL — SIGNIFICANT CHANGE UP (ref 2.5–4.5)
PLATELET # BLD AUTO: 232 K/UL — SIGNIFICANT CHANGE UP (ref 150–400)
POTASSIUM SERPL-MCNC: 5 MMOL/L — SIGNIFICANT CHANGE UP (ref 3.5–5.3)
POTASSIUM SERPL-SCNC: 5 MMOL/L — SIGNIFICANT CHANGE UP (ref 3.5–5.3)
PROT SERPL-MCNC: 6.5 G/DL — SIGNIFICANT CHANGE UP (ref 6–8.3)
PROTHROM AB SERPL-ACNC: 11.7 SEC — SIGNIFICANT CHANGE UP (ref 10.5–13.4)
RBC # BLD: 4.62 M/UL — SIGNIFICANT CHANGE UP (ref 4.2–5.8)
RBC # FLD: 13.9 % — SIGNIFICANT CHANGE UP (ref 10.3–14.5)
RH IG SCN BLD-IMP: POSITIVE — SIGNIFICANT CHANGE UP
SODIUM SERPL-SCNC: 138 MMOL/L — SIGNIFICANT CHANGE UP (ref 135–145)
WBC # BLD: 5.81 K/UL — SIGNIFICANT CHANGE UP (ref 3.8–10.5)
WBC # FLD AUTO: 5.81 K/UL — SIGNIFICANT CHANGE UP (ref 3.8–10.5)

## 2022-05-09 PROCEDURE — 74328 X-RAY BILE DUCT ENDOSCOPY: CPT | Mod: 26,GC

## 2022-05-09 PROCEDURE — 43264 ERCP REMOVE DUCT CALCULI: CPT | Mod: GC

## 2022-05-09 PROCEDURE — 43262 ENDO CHOLANGIOPANCREATOGRAPH: CPT | Mod: GC,59

## 2022-05-09 PROCEDURE — 43239 EGD BIOPSY SINGLE/MULTIPLE: CPT | Mod: GC,59

## 2022-05-09 PROCEDURE — 88305 TISSUE EXAM BY PATHOLOGIST: CPT | Mod: 26

## 2022-05-09 DEVICE — BLLN EXTRACT FUSION QUATRO 8.5 10 12 15MM: Type: IMPLANTABLE DEVICE | Status: FUNCTIONAL

## 2022-05-09 DEVICE — AUTOTOME CANNULATING SPHINCTEROTOME RX 44 20MM: Type: IMPLANTABLE DEVICE | Status: FUNCTIONAL

## 2022-05-09 DEVICE — CATH BLLN EXTRACT DIST GUIDE WIRE 15MM 3LUM: Type: IMPLANTABLE DEVICE | Status: FUNCTIONAL

## 2022-05-09 DEVICE — HYDRATOME 44: Type: IMPLANTABLE DEVICE | Status: FUNCTIONAL

## 2022-05-09 RX ORDER — SODIUM CHLORIDE 9 MG/ML
500 INJECTION INTRAMUSCULAR; INTRAVENOUS; SUBCUTANEOUS
Refills: 0 | Status: DISCONTINUED | OUTPATIENT
Start: 2022-05-09 | End: 2022-05-09

## 2022-05-09 RX ORDER — SODIUM CHLORIDE 9 MG/ML
1000 INJECTION, SOLUTION INTRAVENOUS ONCE
Refills: 0 | Status: COMPLETED | OUTPATIENT
Start: 2022-05-09 | End: 2022-05-09

## 2022-05-09 RX ADMIN — HYDROMORPHONE HYDROCHLORIDE 0.5 MILLIGRAM(S): 2 INJECTION INTRAMUSCULAR; INTRAVENOUS; SUBCUTANEOUS at 02:40

## 2022-05-09 RX ADMIN — SODIUM CHLORIDE 500 MILLILITER(S): 9 INJECTION, SOLUTION INTRAVENOUS at 22:03

## 2022-05-09 RX ADMIN — ERTAPENEM SODIUM 120 MILLIGRAM(S): 1 INJECTION, POWDER, LYOPHILIZED, FOR SOLUTION INTRAMUSCULAR; INTRAVENOUS at 22:03

## 2022-05-09 RX ADMIN — ENOXAPARIN SODIUM 40 MILLIGRAM(S): 100 INJECTION SUBCUTANEOUS at 04:46

## 2022-05-09 RX ADMIN — HYDROMORPHONE HYDROCHLORIDE 0.5 MILLIGRAM(S): 2 INJECTION INTRAMUSCULAR; INTRAVENOUS; SUBCUTANEOUS at 02:09

## 2022-05-09 NOTE — CHART NOTE - NSCHARTNOTEFT_GEN_A_CORE
STATUS POST:  ERCP  POST OPERATIVE DAY #: 0    Patient seen and examined at bedside. Pain controlled. tolerating CLD. denies dizziness, SOB, CP or palpitations. denies f/c/n/v    Vital Signs Last 24 Hrs  T(C): 37.1 (09 May 2022 21:14), Max: 37.2 (09 May 2022 13:09)  T(F): 98.7 (09 May 2022 21:14), Max: 98.9 (09 May 2022 13:09)  HR: 62 (09 May 2022 21:14) (59 - 76)  BP: 126/63 (09 May 2022 21:14) (114/76 - 153/69)  BP(mean): --  RR: 18 (09 May 2022 21:14) (13 - 20)  SpO2: 97% (09 May 2022 21:14) (97% - 100%)  I&O's Summary    08 May 2022 07:01  -  09 May 2022 07:00  --------------------------------------------------------  IN: 2690 mL / OUT: 2650 mL / NET: 40 mL    09 May 2022 07:01  -  09 May 2022 22:27  --------------------------------------------------------  IN: 1680 mL / OUT: 1775 mL / NET: -95 mL      I&O's Detail    08 May 2022 07:01  -  09 May 2022 07:00  --------------------------------------------------------  IN:    IV PiggyBack: 50 mL    Lactated Ringers: 2400 mL    Oral Fluid: 240 mL  Total IN: 2690 mL    OUT:    Voided (mL): 2650 mL  Total OUT: 2650 mL    Total NET: 40 mL      09 May 2022 07:01  -  09 May 2022 22:27  --------------------------------------------------------  IN:    Lactated Ringers: 1200 mL    Oral Fluid: 480 mL  Total IN: 1680 mL    OUT:    Voided (mL): 1775 mL  Total OUT: 1775 mL    Total NET: -95 mL          MEDICATIONS  (STANDING):  enoxaparin Injectable 40 milliGRAM(s) SubCutaneous every 24 hours  ertapenem  IVPB      ertapenem  IVPB 1000 milliGRAM(s) IV Intermittent every 24 hours  lactated ringers. 1000 milliLiter(s) (100 mL/Hr) IV Continuous <Continuous>    MEDICATIONS  (PRN):  HYDROmorphone  Injectable 0.5 milliGRAM(s) IV Push every 6 hours PRN Moderate Pain (4 - 6)      LABS:                        14.1   5.81  )-----------( 232      ( 09 May 2022 05:04 )             42.4     05-09    138  |  100  |  8   ----------------------------<  94  5.0   |  23  |  1.02    Ca    9.3      09 May 2022 05:03  Phos  3.8     05-09  Mg     2.0     05-09    TPro  6.5  /  Alb  3.9  /  TBili  4.7<H>  /  DBili  x   /  AST  261<H>  /  ALT  533<H>  /  AlkPhos  275<H>  05-09    PT/INR - ( 09 May 2022 05:04 )   PT: 11.7 sec;   INR: 1.02 ratio         PTT - ( 09 May 2022 05:04 )  PTT:30.9 sec      RADIOLOGY & ADDITIONAL STUDIES:    PHYSICAL EXAM:      Constitutional: NAD, A&O x4  Respiratory: non-labor breathing   Cardiovascular: RRR  Gastrointestinal: abd soft, ND/NT    ERCP 5/9/22:        EGD:                       - Erosive gastritis. Biopsied for H. pylori.                       ERCP:                       - Cholangitis (small amount of pus flowed from bile duct)                       - Choledocholithiasis was found. Complete removal was accomplished by biliary                        sphincterotomy and balloon extraction.  Recommendation:      - Return patient to hospital morin for ongoing care.                       - May resume previous outpatient diet tonight.                       - Monitor CBC/LFTs                       - LR IV hydration (1 liter bolus ordered on hospital morin postprocedure, then                        resume prior rate of 100 mL/hr)                       - Await pathology results.                       - Complete course of antibiotics given cholangitis.                       - Surgery team followup for cholecystectomy.                       - If patient develops significant pain overnight, please check STAT labs                        including CBC, CMP, amylase, lipase, obtain upright CXR, and page GI team on                        call.        A/P: 51y Male Cholecystitis with choledocholithiasis s/p ERCP    - Diet: CLD, IVF  - Activity: OOB as tolerated  - Labs: f/u AM labs  - Pain medication: tylenol and oxycodone prn   - DVT ppx: Lovenox     ACS  [id number]

## 2022-05-09 NOTE — PROGRESS NOTE ADULT - SUBJECTIVE AND OBJECTIVE BOX
GENERAL SURGERY DAILY PROGRESS NOTE:    Interval:  No acute events overnight.    Subjective:  Patient seen and examined. Reports pain is well controlled. Denies N/V.    Vital Signs Last 24 Hrs  T(C): 36.8 (08 May 2022 20:40), Max: 37.1 (08 May 2022 13:54)  T(F): 98.2 (08 May 2022 20:40), Max: 98.7 (08 May 2022 13:54)  HR: 63 (08 May 2022 20:40) (55 - 67)  BP: 117/63 (08 May 2022 20:40) (111/56 - 145/73)  BP(mean): --  RR: 18 (08 May 2022 20:40) (16 - 18)  SpO2: 100% (08 May 2022 20:40) (97% - 100%)    PHYSICAL EXAM:    General: NAD, Sitting in bed comfortably  HEENT: NC/AT, EOMI  Neck: Soft, supple  Cardio: regular rate  Resp: unlabored respirations  Thorax: No chest wall tenderness  Breast: No lesions/masses, no drainage  GI/Abd: Soft, RUQ tenderness, no rebound/guarding, no masses palpated  Musculoskeletal: All 4 extremities moving spontaneously, no limitations    I&O's Detail    07 May 2022 07:01  -  08 May 2022 07:00  --------------------------------------------------------  IN:    Lactated Ringers: 1000 mL  Total IN: 1000 mL    OUT:    Voided (mL): 300 mL  Total OUT: 300 mL    Total NET: 700 mL      08 May 2022 07:01  -  09 May 2022 00:38  --------------------------------------------------------  IN:    IV PiggyBack: 50 mL    Lactated Ringers: 1200 mL    Oral Fluid: 240 mL  Total IN: 1490 mL    OUT:    Voided (mL): 1650 mL  Total OUT: 1650 mL    Total NET: -160 mL          Daily     Daily     MEDICATIONS  (STANDING):  enoxaparin Injectable 40 milliGRAM(s) SubCutaneous every 24 hours  ertapenem  IVPB      ertapenem  IVPB 1000 milliGRAM(s) IV Intermittent every 24 hours  lactated ringers. 1000 milliLiter(s) (100 mL/Hr) IV Continuous <Continuous>    MEDICATIONS  (PRN):  HYDROmorphone  Injectable 0.5 milliGRAM(s) IV Push every 6 hours PRN Moderate Pain (4 - 6)      LABS:                        13.5   7.50  )-----------( 224      ( 08 May 2022 07:49 )             41.0     05-08    140  |  103  |  9   ----------------------------<  96  4.3   |  23  |  1.05    Ca    9.1      08 May 2022 07:36  Phos  3.1     05-08  Mg     2.0     05-08    TPro  6.6  /  Alb  3.9  /  TBili  3.3<H>  /  DBili  x   /  AST  503<H>  /  ALT  613<H>  /  AlkPhos  208<H>  05-08    PT/INR - ( 07 May 2022 15:57 )   PT: 11.6 sec;   INR: 1.00 ratio         PTT - ( 07 May 2022 15:57 )  PTT:30.2 sec      51M presenting with clinical acute cholecystitis and choledocholithiasis.    PLAN:  - MRCP to assess for choledocholithiasis  - NPO/IVFs  - IV Abx w/ Ertapenem  - Pain control as needed  - FU AM labs  - GI consult 5/8 if patient with persistent transaminitis/hyperbilirubinemia and/or MRCP significant for choledocholithiasis    ACS, [id number] GENERAL SURGERY DAILY PROGRESS NOTE:    Interval:  No acute events overnight.    Subjective:  Patient seen and examined. Reports pain is well controlled. Denies N/V.    Vital Signs Last 24 Hrs  T(C): 36.8 (08 May 2022 20:40), Max: 37.1 (08 May 2022 13:54)  T(F): 98.2 (08 May 2022 20:40), Max: 98.7 (08 May 2022 13:54)  HR: 63 (08 May 2022 20:40) (55 - 67)  BP: 117/63 (08 May 2022 20:40) (111/56 - 145/73)  BP(mean): --  RR: 18 (08 May 2022 20:40) (16 - 18)  SpO2: 100% (08 May 2022 20:40) (97% - 100%)    PHYSICAL EXAM:    General: NAD, Sitting in bed comfortably  HEENT: NC/AT, EOMI  Neck: Soft, supple  Cardio: regular rate  Resp: unlabored respirations  Thorax: No chest wall tenderness  Breast: No lesions/masses, no drainage  GI/Abd: Soft, RUQ tenderness, no rebound/guarding, no masses palpated  Musculoskeletal: All 4 extremities moving spontaneously, no limitations    I&O's Detail    07 May 2022 07:01  -  08 May 2022 07:00  --------------------------------------------------------  IN:    Lactated Ringers: 1000 mL  Total IN: 1000 mL    OUT:    Voided (mL): 300 mL  Total OUT: 300 mL    Total NET: 700 mL      08 May 2022 07:01  -  09 May 2022 00:38  --------------------------------------------------------  IN:    IV PiggyBack: 50 mL    Lactated Ringers: 1200 mL    Oral Fluid: 240 mL  Total IN: 1490 mL    OUT:    Voided (mL): 1650 mL  Total OUT: 1650 mL    Total NET: -160 mL          Daily     Daily     MEDICATIONS  (STANDING):  enoxaparin Injectable 40 milliGRAM(s) SubCutaneous every 24 hours  ertapenem  IVPB      ertapenem  IVPB 1000 milliGRAM(s) IV Intermittent every 24 hours  lactated ringers. 1000 milliLiter(s) (100 mL/Hr) IV Continuous <Continuous>    MEDICATIONS  (PRN):  HYDROmorphone  Injectable 0.5 milliGRAM(s) IV Push every 6 hours PRN Moderate Pain (4 - 6)      LABS:                        13.5   7.50  )-----------( 224      ( 08 May 2022 07:49 )             41.0     05-08    140  |  103  |  9   ----------------------------<  96  4.3   |  23  |  1.05    Ca    9.1      08 May 2022 07:36  Phos  3.1     05-08  Mg     2.0     05-08    TPro  6.6  /  Alb  3.9  /  TBili  3.3<H>  /  DBili  x   /  AST  503<H>  /  ALT  613<H>  /  AlkPhos  208<H>  05-08    PT/INR - ( 07 May 2022 15:57 )   PT: 11.6 sec;   INR: 1.00 ratio         PTT - ( 07 May 2022 15:57 )  PTT:30.2 sec      51M presenting with clinical acute cholecystitis and choledocholithiasis.    PLAN:  - MRCP to assess for choledocholithiasis, done will follow up read  - NPO/IVFs  - IV Abx w/ Ertapenem  - Pain control as needed  - FU AM labs  - f/u GI consult, if patient with persistent transaminitis/hyperbilirubinemia and/or MRCP significant for choledocholithiasis    ACS, [id number]

## 2022-05-09 NOTE — PRE PROCEDURE NOTE - PRE PROCEDURE EVALUATION
Attending Physician:    Dr. Pena                        Procedure:   ERCP    Indication for Procedure:   CBD Stone  ________________________________________________________  PAST MEDICAL & SURGICAL HISTORY:    Cholelithiasis    H/O knee surgery      ALLERGIES:  No Known Allergies    HOME MEDICATIONS:    AICD/PPM: [ ] yes   [X ] no    PERTINENT LAB DATA:                        14.1   5.81  )-----------( 232      ( 09 May 2022 05:04 )             42.4     05-09    138  |  100  |  8   ----------------------------<  94  5.0   |  23  |  1.02    Ca    9.3      09 May 2022 05:03  Phos  3.8     05-09  Mg     2.0     05-09  TPro  6.5  /  Alb  3.9  /  TBili  4.7<H>  /  DBili  x   /  AST  261<H>  /  ALT  533<H>  /  AlkPhos  275<H>  05-09  PT/INR - ( 09 May 2022 05:04 )   PT: 11.7 sec;   INR: 1.02 ratio     PTT - ( 09 May 2022 05:04 )  PTT:30.9 sec    PHYSICAL EXAMINATION:    T(C): 36.6  HR: 62  BP: 140/73  RR: 13  SpO2: 99%    Constitutional: NAD    Neck:  No JVD  Respiratory: CTAB/L  Cardiovascular: S1 and S2  Gastrointestinal: BS+, soft, NT/ND  Extremities: No peripheral edema  Neurological: A/O x 4      COMMENTS:    The patient is a suitable candidate for the planned procedure unless box checked [ ]  No, explain:

## 2022-05-10 ENCOUNTER — TRANSCRIPTION ENCOUNTER (OUTPATIENT)
Age: 52
End: 2022-05-10

## 2022-05-10 LAB
ALBUMIN SERPL ELPH-MCNC: 3.6 G/DL — SIGNIFICANT CHANGE UP (ref 3.3–5)
ALP SERPL-CCNC: 265 U/L — HIGH (ref 40–120)
ALT FLD-CCNC: 467 U/L — HIGH (ref 10–45)
ANION GAP SERPL CALC-SCNC: 11 MMOL/L — SIGNIFICANT CHANGE UP (ref 5–17)
AST SERPL-CCNC: 206 U/L — HIGH (ref 10–40)
BILIRUB SERPL-MCNC: 2.6 MG/DL — HIGH (ref 0.2–1.2)
BUN SERPL-MCNC: 8 MG/DL — SIGNIFICANT CHANGE UP (ref 7–23)
CALCIUM SERPL-MCNC: 9.2 MG/DL — SIGNIFICANT CHANGE UP (ref 8.4–10.5)
CHLORIDE SERPL-SCNC: 104 MMOL/L — SIGNIFICANT CHANGE UP (ref 96–108)
CO2 SERPL-SCNC: 26 MMOL/L — SIGNIFICANT CHANGE UP (ref 22–31)
CREAT SERPL-MCNC: 1.13 MG/DL — SIGNIFICANT CHANGE UP (ref 0.5–1.3)
EGFR: 79 ML/MIN/1.73M2 — SIGNIFICANT CHANGE UP
GLUCOSE SERPL-MCNC: 119 MG/DL — HIGH (ref 70–99)
HCT VFR BLD CALC: 39.1 % — SIGNIFICANT CHANGE UP (ref 39–50)
HGB BLD-MCNC: 13 G/DL — SIGNIFICANT CHANGE UP (ref 13–17)
MAGNESIUM SERPL-MCNC: 1.9 MG/DL — SIGNIFICANT CHANGE UP (ref 1.6–2.6)
MCHC RBC-ENTMCNC: 30.2 PG — SIGNIFICANT CHANGE UP (ref 27–34)
MCHC RBC-ENTMCNC: 33.2 GM/DL — SIGNIFICANT CHANGE UP (ref 32–36)
MCV RBC AUTO: 90.7 FL — SIGNIFICANT CHANGE UP (ref 80–100)
NRBC # BLD: 0 /100 WBCS — SIGNIFICANT CHANGE UP (ref 0–0)
PHOSPHATE SERPL-MCNC: 3.9 MG/DL — SIGNIFICANT CHANGE UP (ref 2.5–4.5)
PLATELET # BLD AUTO: 232 K/UL — SIGNIFICANT CHANGE UP (ref 150–400)
POTASSIUM SERPL-MCNC: 4.4 MMOL/L — SIGNIFICANT CHANGE UP (ref 3.5–5.3)
POTASSIUM SERPL-SCNC: 4.4 MMOL/L — SIGNIFICANT CHANGE UP (ref 3.5–5.3)
PROT SERPL-MCNC: 6 G/DL — SIGNIFICANT CHANGE UP (ref 6–8.3)
RBC # BLD: 4.31 M/UL — SIGNIFICANT CHANGE UP (ref 4.2–5.8)
RBC # FLD: 14 % — SIGNIFICANT CHANGE UP (ref 10.3–14.5)
SODIUM SERPL-SCNC: 141 MMOL/L — SIGNIFICANT CHANGE UP (ref 135–145)
WBC # BLD: 5.72 K/UL — SIGNIFICANT CHANGE UP (ref 3.8–10.5)
WBC # FLD AUTO: 5.72 K/UL — SIGNIFICANT CHANGE UP (ref 3.8–10.5)

## 2022-05-10 PROCEDURE — 99232 SBSQ HOSP IP/OBS MODERATE 35: CPT | Mod: GC

## 2022-05-10 RX ORDER — DEXTROSE MONOHYDRATE, SODIUM CHLORIDE, AND POTASSIUM CHLORIDE 50; .745; 4.5 G/1000ML; G/1000ML; G/1000ML
1000 INJECTION, SOLUTION INTRAVENOUS
Refills: 0 | Status: DISCONTINUED | OUTPATIENT
Start: 2022-05-10 | End: 2022-05-11

## 2022-05-10 RX ADMIN — ENOXAPARIN SODIUM 40 MILLIGRAM(S): 100 INJECTION SUBCUTANEOUS at 06:24

## 2022-05-10 RX ADMIN — ERTAPENEM SODIUM 120 MILLIGRAM(S): 1 INJECTION, POWDER, LYOPHILIZED, FOR SOLUTION INTRAMUSCULAR; INTRAVENOUS at 19:01

## 2022-05-10 NOTE — PROGRESS NOTE ADULT - ASSESSMENT
A/P: 51y Male Cholecystitis with choledocholithiasis s/p ERCP 5/9    - Diet: CLD, IVF  - Activity: OOB as tolerated  - Labs: f/u AM labs  - Pain medication: tylenol and oxycodone prn   - DVT ppx: Lovenox   - OR lap mady on Wed    ACS  [id number].   A/P: 51y Male Cholecystitis with choledocholithiasis s/p ERCP 5/9    - Diet: CLD --> Advanced to LRD today, NPO after midnight  - Activity: OOB as tolerated  - Labs: f/u AM labs  - Pain medication: tylenol and oxycodone prn   - DVT ppx: Lovenox   - OR lap mady tomorrow 5/11 2:30pm    ACS  [id number].

## 2022-05-10 NOTE — PROGRESS NOTE ADULT - ATTENDING COMMENTS
as above  choledocholithiasis s/p ERCP with sphincterotomy with stone removal  Doing well today  Liver tests trending down  Pending lap mady tomorrow with surgery    Thank you for this interesting consult.  Please call the advanced GI service with any questions or concerns.

## 2022-05-10 NOTE — PROGRESS NOTE ADULT - SUBJECTIVE AND OBJECTIVE BOX
Chief Complaint:  Patient is a 51y old  Male who presents with a chief complaint of Clinical acute cholecystitis w/ choledocholithiasis (10 May 2022 01:34)      Interval Events: No new events. No pain, n/v.    Allergies:  No Known Allergies      Hospital Medications:  dextrose 5% + sodium chloride 0.45% with potassium chloride 20 mEq/L 1000 milliLiter(s) IV Continuous <Continuous>  enoxaparin Injectable 40 milliGRAM(s) SubCutaneous every 24 hours  ertapenem  IVPB      ertapenem  IVPB 1000 milliGRAM(s) IV Intermittent every 24 hours      PMHX/PSHX:  Cholelithiasis    H/O knee surgery        Family history:  No pertinent family history in first degree relatives        ROS: As per HPI, 14-point ROS negative otherwise.    General:  No wt loss, fevers, chills, night sweats, fatigue,   Eyes:  Good vision, no reported pain  ENT:  No sore throat, pain, runny nose, dysphagia  CV:  No pain, palpitations, hypo/hypertension  Resp:  No dyspnea, cough, tachypnea, wheezing  GI:  See HPI  :  No pain, bleeding, incontinence, nocturia  Muscle:  No pain, weakness  Neuro:  No weakness, tingling, memory problems  Psych:  No fatigue, insomnia, mood problems, depression  Endocrine:  No polyuria, polydipsia, cold/heat intolerance  Heme:  No petechiae, ecchymosis, easy bruisability  Skin:  No rash, edema      PHYSICAL EXAM:     Vital Signs:  Vital Signs Last 24 Hrs  T(C): 37.4 (10 May 2022 09:27), Max: 37.4 (10 May 2022 09:27)  T(F): 99.4 (10 May 2022 09:27), Max: 99.4 (10 May 2022 09:27)  HR: 66 (10 May 2022 09:27) (57 - 76)  BP: 109/72 (10 May 2022 09:27) (109/72 - 146/57)  BP(mean): --  RR: 18 (10 May 2022 09:27) (13 - 20)  SpO2: 98% (10 May 2022 09:27) (97% - 100%)  Daily Height in cm: 172.72 (09 May 2022 14:01)    Daily     GENERAL:  appears comfortable, no acute distress  HEENT:  NC/AT,  conjunctivae clear, sclera -anicteric  CHEST:  no increased effort  HEART:  Regular rate and rhythm  ABDOMEN:  Soft, non-tender, non-distended,  no masses ,no hepato-splenomegaly,   EXTREMITIES:  no cyanosis, clubbing or edema  SKIN:  No rash/erythema/ecchymoses/petechiae/wounds  NEURO:  Alert, oriented    LABS:                        13.0   5.72  )-----------( 232      ( 10 May 2022 06:31 )             39.1     05-10    141  |  104  |  8   ----------------------------<  119<H>  4.4   |  26  |  1.13    Ca    9.2      10 May 2022 06:31  Phos  3.9     05-10  Mg     1.9     05-10    TPro  6.0  /  Alb  3.6  /  TBili  2.6<H>  /  DBili  x   /  AST  206<H>  /  ALT  467<H>  /  AlkPhos  265<H>  05-10    LIVER FUNCTIONS - ( 10 May 2022 06:31 )  Alb: 3.6 g/dL / Pro: 6.0 g/dL / ALK PHOS: 265 U/L / ALT: 467 U/L / AST: 206 U/L / GGT: x           PT/INR - ( 09 May 2022 05:04 )   PT: 11.7 sec;   INR: 1.02 ratio         PTT - ( 09 May 2022 05:04 )  PTT:30.9 sec        Imaging:    < from: ERCP (05.09.22 @ 14:01) >  Impression:          EGD:                       - Erosive gastritis. Biopsied for H. pylori.                       ERCP:                       - Cholangitis (small amount of pus flowed from bile duct)                       - Choledocholithiasis was found. Complete removal was accomplished by biliary                        sphincterotomy and balloon extraction.  Recommendation:      - Return patient to hospital [facility] for ongoing care.                       - May resume previous outpatient diet tonight.                       - Monitor CBC/LFTs                       - LR IV hydration (1 liter bolus ordered on hospital [facility] postprocedure, then                        resume prior rate of 100 mL/hr)                       - Await pathology results.                       - Complete course of antibiotics given cholangitis.     - Surgery team followup for cholecystectomy.                       - If patient develops significant pain overnight, please check STAT labs                        including CBC, CMP, amylase, lipase, obtain upright CXR, and page GI team on                  call.                                                 < end of copied text >

## 2022-05-10 NOTE — PROGRESS NOTE ADULT - ASSESSMENT
51y Male with h/o cholelithiasis who initially presented with a 1 week history of moderate to severe RUQ pain w/ associated nausea/NBNB vomiting. Patient has had similar episode 3 months ago and had scheduled surgery in the summer, however pain recurred 1 week ago and was persistent, thus prompting his ED visit.     # Choledocholithiasis/cholangitis: s/p ERCP with sphincterotomy and stone/sludge/pus removal  # Acute cholecystitis    Recommendations:  - trend LFTs while inpatient  - diet as tolerated  - complete course of antibiotics   - no further inpatient GI plans  - supportive care

## 2022-05-11 ENCOUNTER — RESULT REVIEW (OUTPATIENT)
Age: 52
End: 2022-05-11

## 2022-05-11 ENCOUNTER — TRANSCRIPTION ENCOUNTER (OUTPATIENT)
Age: 52
End: 2022-05-11

## 2022-05-11 LAB
ALBUMIN SERPL ELPH-MCNC: 3.9 G/DL — SIGNIFICANT CHANGE UP (ref 3.3–5)
ALP SERPL-CCNC: 254 U/L — HIGH (ref 40–120)
ALT FLD-CCNC: 409 U/L — HIGH (ref 10–45)
ANION GAP SERPL CALC-SCNC: 13 MMOL/L — SIGNIFICANT CHANGE UP (ref 5–17)
APTT BLD: 33.7 SEC — SIGNIFICANT CHANGE UP (ref 27.5–35.5)
AST SERPL-CCNC: 138 U/L — HIGH (ref 10–40)
BILIRUB SERPL-MCNC: 1.2 MG/DL — SIGNIFICANT CHANGE UP (ref 0.2–1.2)
BLD GP AB SCN SERPL QL: NEGATIVE — SIGNIFICANT CHANGE UP
BUN SERPL-MCNC: 16 MG/DL — SIGNIFICANT CHANGE UP (ref 7–23)
CALCIUM SERPL-MCNC: 8.7 MG/DL — SIGNIFICANT CHANGE UP (ref 8.4–10.5)
CHLORIDE SERPL-SCNC: 104 MMOL/L — SIGNIFICANT CHANGE UP (ref 96–108)
CO2 SERPL-SCNC: 24 MMOL/L — SIGNIFICANT CHANGE UP (ref 22–31)
CREAT SERPL-MCNC: 1.02 MG/DL — SIGNIFICANT CHANGE UP (ref 0.5–1.3)
EGFR: 89 ML/MIN/1.73M2 — SIGNIFICANT CHANGE UP
GLUCOSE SERPL-MCNC: 106 MG/DL — HIGH (ref 70–99)
HCT VFR BLD CALC: 39.8 % — SIGNIFICANT CHANGE UP (ref 39–50)
HGB BLD-MCNC: 13.1 G/DL — SIGNIFICANT CHANGE UP (ref 13–17)
INR BLD: 1.02 RATIO — SIGNIFICANT CHANGE UP (ref 0.88–1.16)
MAGNESIUM SERPL-MCNC: 1.9 MG/DL — SIGNIFICANT CHANGE UP (ref 1.6–2.6)
MCHC RBC-ENTMCNC: 30.6 PG — SIGNIFICANT CHANGE UP (ref 27–34)
MCHC RBC-ENTMCNC: 32.9 GM/DL — SIGNIFICANT CHANGE UP (ref 32–36)
MCV RBC AUTO: 93 FL — SIGNIFICANT CHANGE UP (ref 80–100)
NRBC # BLD: 0 /100 WBCS — SIGNIFICANT CHANGE UP (ref 0–0)
PHOSPHATE SERPL-MCNC: 4.5 MG/DL — SIGNIFICANT CHANGE UP (ref 2.5–4.5)
PLATELET # BLD AUTO: 229 K/UL — SIGNIFICANT CHANGE UP (ref 150–400)
POTASSIUM SERPL-MCNC: 3.9 MMOL/L — SIGNIFICANT CHANGE UP (ref 3.5–5.3)
POTASSIUM SERPL-SCNC: 3.9 MMOL/L — SIGNIFICANT CHANGE UP (ref 3.5–5.3)
PROT SERPL-MCNC: 6.5 G/DL — SIGNIFICANT CHANGE UP (ref 6–8.3)
PROTHROM AB SERPL-ACNC: 11.8 SEC — SIGNIFICANT CHANGE UP (ref 10.5–13.4)
RBC # BLD: 4.28 M/UL — SIGNIFICANT CHANGE UP (ref 4.2–5.8)
RBC # FLD: 14.6 % — HIGH (ref 10.3–14.5)
RH IG SCN BLD-IMP: POSITIVE — SIGNIFICANT CHANGE UP
SODIUM SERPL-SCNC: 141 MMOL/L — SIGNIFICANT CHANGE UP (ref 135–145)
WBC # BLD: 5.07 K/UL — SIGNIFICANT CHANGE UP (ref 3.8–10.5)
WBC # FLD AUTO: 5.07 K/UL — SIGNIFICANT CHANGE UP (ref 3.8–10.5)

## 2022-05-11 PROCEDURE — 88304 TISSUE EXAM BY PATHOLOGIST: CPT | Mod: 26

## 2022-05-11 PROCEDURE — 47562 LAPAROSCOPIC CHOLECYSTECTOMY: CPT

## 2022-05-11 DEVICE — SEALANT VISTASEAL FIBRIN HUMAN 4ML 4/PK: Type: IMPLANTABLE DEVICE | Site: ABDOMINAL | Status: FUNCTIONAL

## 2022-05-11 DEVICE — LIGATING CLIPS WECK HEMOLOK POLYMER LARGE (PURPLE) 6: Type: IMPLANTABLE DEVICE | Site: ABDOMINAL | Status: FUNCTIONAL

## 2022-05-11 RX ORDER — ERTAPENEM SODIUM 1 G/1
1000 INJECTION, POWDER, LYOPHILIZED, FOR SOLUTION INTRAMUSCULAR; INTRAVENOUS EVERY 24 HOURS
Refills: 0 | Status: DISCONTINUED | OUTPATIENT
Start: 2022-05-11 | End: 2022-05-12

## 2022-05-11 RX ORDER — OXYCODONE HYDROCHLORIDE 5 MG/1
5 TABLET ORAL EVERY 4 HOURS
Refills: 0 | Status: DISCONTINUED | OUTPATIENT
Start: 2022-05-11 | End: 2022-05-12

## 2022-05-11 RX ORDER — ONDANSETRON 8 MG/1
4 TABLET, FILM COATED ORAL ONCE
Refills: 0 | Status: DISCONTINUED | OUTPATIENT
Start: 2022-05-11 | End: 2022-05-11

## 2022-05-11 RX ORDER — ACETAMINOPHEN 500 MG
975 TABLET ORAL EVERY 6 HOURS
Refills: 0 | Status: DISCONTINUED | OUTPATIENT
Start: 2022-05-11 | End: 2022-05-13

## 2022-05-11 RX ORDER — HYDROMORPHONE HYDROCHLORIDE 2 MG/ML
0.5 INJECTION INTRAMUSCULAR; INTRAVENOUS; SUBCUTANEOUS
Refills: 0 | Status: DISCONTINUED | OUTPATIENT
Start: 2022-05-11 | End: 2022-05-11

## 2022-05-11 RX ORDER — OXYCODONE HYDROCHLORIDE 5 MG/1
2.5 TABLET ORAL EVERY 4 HOURS
Refills: 0 | Status: DISCONTINUED | OUTPATIENT
Start: 2022-05-11 | End: 2022-05-12

## 2022-05-11 RX ORDER — ENOXAPARIN SODIUM 100 MG/ML
40 INJECTION SUBCUTANEOUS EVERY 24 HOURS
Refills: 0 | Status: DISCONTINUED | OUTPATIENT
Start: 2022-05-12 | End: 2022-05-13

## 2022-05-11 RX ORDER — FENTANYL CITRATE 50 UG/ML
50 INJECTION INTRAVENOUS
Refills: 0 | Status: DISCONTINUED | OUTPATIENT
Start: 2022-05-11 | End: 2022-05-11

## 2022-05-11 RX ADMIN — ENOXAPARIN SODIUM 40 MILLIGRAM(S): 100 INJECTION SUBCUTANEOUS at 23:06

## 2022-05-11 RX ADMIN — ERTAPENEM SODIUM 120 MILLIGRAM(S): 1 INJECTION, POWDER, LYOPHILIZED, FOR SOLUTION INTRAMUSCULAR; INTRAVENOUS at 23:06

## 2022-05-11 RX ADMIN — Medication 975 MILLIGRAM(S): at 23:06

## 2022-05-11 NOTE — BRIEF OPERATIVE NOTE - NSICDXBRIEFPOSTOP_GEN_ALL_CORE_FT
POST-OP DIAGNOSIS:  Choledocholithiasis 11-May-2022 18:45:54  Federico Crowley  Acute on chronic cholecystitis 11-May-2022 18:45:52  Federico Crowley

## 2022-05-11 NOTE — PROGRESS NOTE ADULT - ASSESSMENT
A/P: 51y Male Cholecystitis with choledocholithiasis s/p ERCP 5/9    - Diet: NPO/IVF  - Activity: OOB as tolerated  - Labs: f/u AM labs  - Pain medication: tylenol and oxycodone prn   - DVT ppx: Lovenox   - OR lap mady 5/11 2:30pm    ACS  [id number].   A/P: 51y Male Cholecystitis with choledocholithiasis s/p ERCP with sphincterotomy and stone/sludge/pus removal on 5/9.     - Diet: NPO/IVF  - Activity: OOB as tolerated  - Labs: f/u AM labs  - Pain medication: Tylenol and oxycodone prn   - DVT ppx: Lovenox   - OR lap mady 5/11 2:30pm      C Copper PA-C   ACS  [id number].

## 2022-05-11 NOTE — PROGRESS NOTE ADULT - SUBJECTIVE AND OBJECTIVE BOX
ACS DAILY PROGRESS NOTE:       SUBJECTIVE/ROS: No acute events overnight. tolerating low fat diet. denies f/c/n/v.      MEDICATIONS  (STANDING):  dextrose 5% + sodium chloride 0.45% with potassium chloride 20 mEq/L 1000 milliLiter(s) (100 mL/Hr) IV Continuous <Continuous>  enoxaparin Injectable 40 milliGRAM(s) SubCutaneous every 24 hours  ertapenem  IVPB      ertapenem  IVPB 1000 milliGRAM(s) IV Intermittent every 24 hours    MEDICATIONS  (PRN):      OBJECTIVE:    Vital Signs Last 24 Hrs  T(C): 36.8 (11 May 2022 00:42), Max: 37.4 (10 May 2022 09:27)  T(F): 98.2 (11 May 2022 00:42), Max: 99.4 (10 May 2022 09:27)  HR: 49 (11 May 2022 00:42) (49 - 66)  BP: 118/63 (11 May 2022 00:42) (103/58 - 125/67)  BP(mean): --  RR: 18 (11 May 2022 00:42) (18 - 18)  SpO2: 100% (11 May 2022 00:42) (98% - 100%)    PHYSICAL EXAM:  Constitutional: NAD, A&O x4  Respiratory: non-labor breathing   Gastrointestinal: abd soft, mininally tender to RUQ      I&O's Detail    09 May 2022 07:01  -  10 May 2022 07:00  --------------------------------------------------------  IN:    IV PiggyBack: 50 mL    Lactated Ringers: 2400 mL    Oral Fluid: 480 mL  Total IN: 2930 mL    OUT:    Voided (mL): 3025 mL  Total OUT: 3025 mL    Total NET: -95 mL      10 May 2022 07:01  -  11 May 2022 02:54  --------------------------------------------------------  IN:    Oral Fluid: 1040 mL  Total IN: 1040 mL    OUT:    Voided (mL): 2300 mL  Total OUT: 2300 mL    Total NET: -1260 mL          Daily     Daily     LABS:                        13.0   5.72  )-----------( 232      ( 10 May 2022 06:31 )             39.1     05-10    141  |  104  |  8   ----------------------------<  119<H>  4.4   |  26  |  1.13    Ca    9.2      10 May 2022 06:31  Phos  3.9     05-10  Mg     1.9     05-10    TPro  6.0  /  Alb  3.6  /  TBili  2.6<H>  /  DBili  x   /  AST  206<H>  /  ALT  467<H>  /  AlkPhos  265<H>  05-10    PT/INR - ( 09 May 2022 05:04 )   PT: 11.7 sec;   INR: 1.02 ratio         PTT - ( 09 May 2022 05:04 )  PTT:30.9 sec            PHYSICAL EXAM:  Constitutional: NAD, A&O x4  Respiratory: non-labor breathing   Gastrointestinal: abd soft, mininally tender to RUQ         ACS DAILY PROGRESS NOTE:       SUBJECTIVE/ROS: No acute events overnight. denies f/c/n/v.      MEDICATIONS  (STANDING):  dextrose 5% + sodium chloride 0.45% with potassium chloride 20 mEq/L 1000 milliLiter(s) (100 mL/Hr) IV Continuous <Continuous>  enoxaparin Injectable 40 milliGRAM(s) SubCutaneous every 24 hours  ertapenem  IVPB      ertapenem  IVPB 1000 milliGRAM(s) IV Intermittent every 24 hours    MEDICATIONS  (PRN):      OBJECTIVE:    Vital Signs Last 24 Hrs  T(C): 36.8 (11 May 2022 00:42), Max: 37.4 (10 May 2022 09:27)  T(F): 98.2 (11 May 2022 00:42), Max: 99.4 (10 May 2022 09:27)  HR: 49 (11 May 2022 00:42) (49 - 66)  BP: 118/63 (11 May 2022 00:42) (103/58 - 125/67)  BP(mean): --  RR: 18 (11 May 2022 00:42) (18 - 18)  SpO2: 100% (11 May 2022 00:42) (98% - 100%)    PHYSICAL EXAM:  Constitutional: NAD, A&O x4  Respiratory: non-labor breathing   Gastrointestinal: abd soft, mininally tender to RUQ      I&O's Detail    09 May 2022 07:01  -  10 May 2022 07:00  --------------------------------------------------------  IN:    IV PiggyBack: 50 mL    Lactated Ringers: 2400 mL    Oral Fluid: 480 mL  Total IN: 2930 mL    OUT:    Voided (mL): 3025 mL  Total OUT: 3025 mL    Total NET: -95 mL      10 May 2022 07:01  -  11 May 2022 02:54  --------------------------------------------------------  IN:    Oral Fluid: 1040 mL  Total IN: 1040 mL    OUT:    Voided (mL): 2300 mL  Total OUT: 2300 mL    Total NET: -1260 mL          Daily     Daily     LABS:                        13.0   5.72  )-----------( 232      ( 10 May 2022 06:31 )             39.1     05-10    141  |  104  |  8   ----------------------------<  119<H>  4.4   |  26  |  1.13    Ca    9.2      10 May 2022 06:31  Phos  3.9     05-10  Mg     1.9     05-10    TPro  6.0  /  Alb  3.6  /  TBili  2.6<H>  /  DBili  x   /  AST  206<H>  /  ALT  467<H>  /  AlkPhos  265<H>  05-10    PT/INR - ( 09 May 2022 05:04 )   PT: 11.7 sec;   INR: 1.02 ratio         PTT - ( 09 May 2022 05:04 )  PTT:30.9 sec            PHYSICAL EXAM:  Constitutional: NAD, A&O x4  Respiratory: non-labor breathing   Gastrointestinal: abd soft, mininally tender to RUQ

## 2022-05-11 NOTE — BRIEF OPERATIVE NOTE - OPERATION/FINDINGS
Gallbladder chronically inflamed with thick rind along the cystic plate, with multiple sizeable gallstones.

## 2022-05-11 NOTE — BRIEF OPERATIVE NOTE - NSICDXBRIEFPREOP_GEN_ALL_CORE_FT
PRE-OP DIAGNOSIS:  Acute on chronic cholecystitis 11-May-2022 18:45:45  Federico Crowley  Choledocholithiasis 11-May-2022 18:45:35  Federico Crowley

## 2022-05-11 NOTE — PRE-ANESTHESIA EVALUATION ADULT - NSANTHOSAYNRD_GEN_A_CORE
No. MARTINEZ screening performed.  STOP BANG Legend: 0-2 = LOW Risk; 3-4 = INTERMEDIATE Risk; 5-8 = HIGH Risk
No. MARTINEZ screening performed.  STOP BANG Legend: 0-2 = LOW Risk; 3-4 = INTERMEDIATE Risk; 5-8 = HIGH Risk

## 2022-05-12 ENCOUNTER — TRANSCRIPTION ENCOUNTER (OUTPATIENT)
Age: 52
End: 2022-05-12

## 2022-05-12 LAB
ALBUMIN SERPL ELPH-MCNC: 3.7 G/DL — SIGNIFICANT CHANGE UP (ref 3.3–5)
ALP SERPL-CCNC: 218 U/L — HIGH (ref 40–120)
ALT FLD-CCNC: 434 U/L — HIGH (ref 10–45)
ANION GAP SERPL CALC-SCNC: 13 MMOL/L — SIGNIFICANT CHANGE UP (ref 5–17)
AST SERPL-CCNC: 189 U/L — HIGH (ref 10–40)
BILIRUB SERPL-MCNC: 1.2 MG/DL — SIGNIFICANT CHANGE UP (ref 0.2–1.2)
BUN SERPL-MCNC: 10 MG/DL — SIGNIFICANT CHANGE UP (ref 7–23)
CALCIUM SERPL-MCNC: 9 MG/DL — SIGNIFICANT CHANGE UP (ref 8.4–10.5)
CHLORIDE SERPL-SCNC: 102 MMOL/L — SIGNIFICANT CHANGE UP (ref 96–108)
CO2 SERPL-SCNC: 25 MMOL/L — SIGNIFICANT CHANGE UP (ref 22–31)
CREAT SERPL-MCNC: 1.12 MG/DL — SIGNIFICANT CHANGE UP (ref 0.5–1.3)
EGFR: 80 ML/MIN/1.73M2 — SIGNIFICANT CHANGE UP
GLUCOSE SERPL-MCNC: 94 MG/DL — SIGNIFICANT CHANGE UP (ref 70–99)
HCT VFR BLD CALC: 40 % — SIGNIFICANT CHANGE UP (ref 39–50)
HGB BLD-MCNC: 13 G/DL — SIGNIFICANT CHANGE UP (ref 13–17)
MAGNESIUM SERPL-MCNC: 1.8 MG/DL — SIGNIFICANT CHANGE UP (ref 1.6–2.6)
MCHC RBC-ENTMCNC: 30.7 PG — SIGNIFICANT CHANGE UP (ref 27–34)
MCHC RBC-ENTMCNC: 32.5 GM/DL — SIGNIFICANT CHANGE UP (ref 32–36)
MCV RBC AUTO: 94.6 FL — SIGNIFICANT CHANGE UP (ref 80–100)
NRBC # BLD: 0 /100 WBCS — SIGNIFICANT CHANGE UP (ref 0–0)
PHOSPHATE SERPL-MCNC: 4.8 MG/DL — HIGH (ref 2.5–4.5)
PLATELET # BLD AUTO: 227 K/UL — SIGNIFICANT CHANGE UP (ref 150–400)
POTASSIUM SERPL-MCNC: 3.8 MMOL/L — SIGNIFICANT CHANGE UP (ref 3.5–5.3)
POTASSIUM SERPL-SCNC: 3.8 MMOL/L — SIGNIFICANT CHANGE UP (ref 3.5–5.3)
PROT SERPL-MCNC: 6.4 G/DL — SIGNIFICANT CHANGE UP (ref 6–8.3)
RBC # BLD: 4.23 M/UL — SIGNIFICANT CHANGE UP (ref 4.2–5.8)
RBC # FLD: 14.7 % — HIGH (ref 10.3–14.5)
SODIUM SERPL-SCNC: 140 MMOL/L — SIGNIFICANT CHANGE UP (ref 135–145)
WBC # BLD: 7.62 K/UL — SIGNIFICANT CHANGE UP (ref 3.8–10.5)
WBC # FLD AUTO: 7.62 K/UL — SIGNIFICANT CHANGE UP (ref 3.8–10.5)

## 2022-05-12 RX ORDER — ERTAPENEM SODIUM 1 G/1
1000 INJECTION, POWDER, LYOPHILIZED, FOR SOLUTION INTRAMUSCULAR; INTRAVENOUS EVERY 24 HOURS
Refills: 0 | Status: DISCONTINUED | OUTPATIENT
Start: 2022-05-12 | End: 2022-05-12

## 2022-05-12 RX ORDER — OXYCODONE HYDROCHLORIDE 5 MG/1
5 TABLET ORAL EVERY 4 HOURS
Refills: 0 | Status: DISCONTINUED | OUTPATIENT
Start: 2022-05-12 | End: 2022-05-13

## 2022-05-12 RX ORDER — OXYCODONE HYDROCHLORIDE 5 MG/1
10 TABLET ORAL EVERY 4 HOURS
Refills: 0 | Status: DISCONTINUED | OUTPATIENT
Start: 2022-05-12 | End: 2022-05-13

## 2022-05-12 RX ADMIN — Medication 975 MILLIGRAM(S): at 06:00

## 2022-05-12 RX ADMIN — ENOXAPARIN SODIUM 40 MILLIGRAM(S): 100 INJECTION SUBCUTANEOUS at 23:03

## 2022-05-12 RX ADMIN — Medication 975 MILLIGRAM(S): at 12:36

## 2022-05-12 RX ADMIN — Medication 975 MILLIGRAM(S): at 18:42

## 2022-05-12 RX ADMIN — Medication 975 MILLIGRAM(S): at 23:03

## 2022-05-12 RX ADMIN — Medication 975 MILLIGRAM(S): at 18:12

## 2022-05-12 RX ADMIN — Medication 975 MILLIGRAM(S): at 00:00

## 2022-05-12 RX ADMIN — Medication 975 MILLIGRAM(S): at 05:27

## 2022-05-12 RX ADMIN — Medication 975 MILLIGRAM(S): at 13:06

## 2022-05-12 NOTE — DISCHARGE NOTE PROVIDER - PROVIDER TOKENS
PROVIDER:[TOKEN:[[id number]],FOLLOWUP:[1 week]] PROVIDER:[TOKEN:[[id number]],FOLLOWUP:[1 week]],PROVIDER:[TOKEN:[[id number]],FOLLOWUP:[1 week]]

## 2022-05-12 NOTE — DISCHARGE NOTE PROVIDER - NSDCFUADDINST_GEN_ALL_CORE_FT
Please utilize Tylenol or Motrin prior to using oxycodone/narcotics.  Do not take more than 4gm of Tylenol in a 24 hour period. Please stagger the medications throughout the day to provide continuous pain control.

## 2022-05-12 NOTE — DISCHARGE NOTE PROVIDER - HOSPITAL COURSE
52y/o man with h/o cholelithiasis presents with acute cholecystitis and possible choledocholithiasis.  He underwent an MRCP to evaluate for choledocholithiasis. It revealed a 9mm CBD stone. He underwent an ERCP and was found to have:  Cholangitis (small amount of pus flowed from bile duct)  Choledocholithiasis was found. Complete removal was accomplished by biliary sphincterotomy and balloon extraction.    He was continued on antibiotics with plan to perform a cholecystectomy.  On 5/11 he underwent a laparoscopic cholecystectomy . The patient tolerated the procedure well (see operative report for full details). Postoperatively, diet was advanced as tolerated.  Pain was controlled with an oral regimen. He was voiding well on his own. On day of discharge, the patient was tolerating diet, ambulating well and pain controlled. He will follow up with Dr. Hendrickson in 1 week.

## 2022-05-12 NOTE — DISCHARGE NOTE PROVIDER - REASON FOR ADMISSION
History  Chief Complaint   Patient presents with    Fall     Patient fell of the couch " a day or two ago"  Concerned because they noticed some bruising and want to make sure she's ok  History provided by: Mother (Grandparents)  History limited by:  Age   used: No    Fall   Mechanism of injury: fall    Injury location:  Face  Facial injury location:  Forehead  Incident location:  Home  Time since incident:  2 days  Arrived directly from scene: no    Fall:     Fall occurred:  Running    Height of fall:  Standing    Impact surface:  [employer] of impact:  Face    Entrapped after fall: no    Protective equipment: none    Suspicion of alcohol use: no    Suspicion of drug use: no    Prior to arrival data:     Bystander interventions:  None    Patient ambulatory at scene: no      Blood loss:  None    Responsiveness at scene:  Alert    Orientation at scene:  Person, place, situation and time    Loss of consciousness: no      Amnesic to event: no      Airway interventions:  None    Breathing interventions:  None    IV access status:  None    IO access:  None    Fluids administered:  None    Cardiac interventions:  None    Medications administered:  None    Immobilization:  None  Associated symptoms: no difficulty breathing, no loss of consciousness, no seizures and no vomiting    Risk factors comment:  A significant past medical history, normal birth history      None       History reviewed  No pertinent past medical history  History reviewed  No pertinent surgical history  Family History   Problem Relation Age of Onset    Lung cancer Maternal Grandfather     Stroke Other      cerebrovascular accident (CVA)    Diabetes Family      I have reviewed and agree with the history as documented      Social History   Substance Use Topics    Smoking status: Passive Smoke Exposure - Never Smoker    Smokeless tobacco: Never Used      Comment: secondhand smoke exposure, per allscripts    Alcohol use Not on file        Review of Systems   Constitutional: Negative for activity change, appetite change, crying, fever and irritability  HENT: Negative for nosebleeds  Eyes: Negative for redness  Cardiovascular: Negative for cyanosis  Gastrointestinal: Negative for vomiting  Genitourinary: Negative for difficulty urinating  Skin: Positive for color change  Negative for pallor, rash and wound  Small bruise to forehead   Neurological: Negative for tremors, seizures, loss of consciousness and syncope  Physical Exam  Physical Exam   Constitutional: She appears well-developed and well-nourished  She is active  No distress  HENT:   Head: No signs of injury  Right Ear: Tympanic membrane normal    Left Ear: Tympanic membrane normal    Nose: Nose normal  No nasal discharge  Mouth/Throat: Mucous membranes are moist  Dentition is normal  Oropharynx is clear  A contusion to center forehead, yellowish  No other contusions,  Wounds  No otorrhea, nasorrhea   Dry mucous in nares  No Mackenzie sign   Eyes: EOM are normal  Pupils are equal, round, and reactive to light  No periorbital swelling or ecchymosis   Neck: Normal range of motion  Neck supple  No neck rigidity  Cardiovascular: Normal rate and regular rhythm  Pulses are strong and palpable  Pulmonary/Chest: Effort normal and breath sounds normal    Abdominal: Soft  There is no tenderness  Musculoskeletal: Normal range of motion  Neurological: She is alert  She has normal strength  Skin: Skin is warm and dry  She is not diaphoretic  Nursing note and vitals reviewed        Vital Signs  ED Triage Vitals [05/23/18 1740]   Temperature Pulse Respirations BP SpO2   98 9 °F (37 2 °C) (!) 134 20 -- 98 %      Temp src Heart Rate Source Patient Position - Orthostatic VS BP Location FiO2 (%)   -- -- -- -- --      Pain Score       No Pain           Vitals:    05/23/18 1740   Pulse: (!) 134       Visual Acuity      ED Medications  Medications - No data to display    Diagnostic Studies  Results Reviewed     None                 No orders to display              Procedures  Procedures       Phone Contacts  ED Phone Contact    ED Course                               MDM  Number of Diagnoses or Management Options  Contusion of forehead: new and does not require workup  Minor closed head injury: new and does not require workup  Diagnosis management comments: Will appearing child  Parental reassurance  Head injury and safety education        Amount and/or Complexity of Data Reviewed  Decide to obtain previous medical records or to obtain history from someone other than the patient: yes  Obtain history from someone other than the patient: yes (Mother  Grandparents)  Review and summarize past medical records: yes    Risk of Complications, Morbidity, and/or Mortality  Presenting problems: minimal  Diagnostic procedures: minimal  Management options: minimal    Patient Progress  Patient progress: improved    CritCare Time    Disposition  Final diagnoses:   Minor closed head injury   Contusion of forehead     Time reflects when diagnosis was documented in both MDM as applicable and the Disposition within this note     Time User Action Codes Description Comment    5/23/2018  5:58 PM Adelaida Tadeo Add [[id number] [id number]] Minor closed head injury     5/23/2018  6:00 PM Marcelino Manus, [address] Contusion of forehead       ED Disposition     ED Disposition Condition Comment    Discharge  Reagen [address] discharge to home/self care  Condition at discharge: Good        Follow-up Information     Follow up With Specialties Details Why Contact Info    Fabiana Camacho MD [facility] Schedule an appointment as soon as possible for a visit  [address]   [phone]            There are no discharge medications for this patient  No discharge procedures on file      ED Provider  Electronically Signed by           Zaire Jesus MD  05/24/18 1015 Clinical acute cholecystitis w/ choledocholithiasis

## 2022-05-12 NOTE — PROGRESS NOTE ADULT - NSPROGADDITIONALINFOA_GEN_ALL_CORE
- Doing well today.  - Abdominal exam benign.  - Gallbladder with significant scarring and inflammatory changes.  - Pain well controlled.  - ADAT.  - Ambulate.

## 2022-05-12 NOTE — PROGRESS NOTE ADULT - SUBJECTIVE AND OBJECTIVE BOX
GENERAL SURGERY DAILY PROGRESS NOTE:    Interval:  No acute events overnight. Patient was post op checked 4 hours after the procedure and was recovering appropriately. Vitals within normal limits    Subjective:  Patient seen and examined. Reports pain is well controlled. Denies N/V.    Vital Signs Last 24 Hrs  T(C): 36.8 (12 May 2022 01:50), Max: 37.2 (11 May 2022 13:35)  T(F): 98.2 (12 May 2022 01:50), Max: 99 (11 May 2022 13:35)  HR: 58 (12 May 2022 01:50) (48 - 66)  BP: 112/63 (12 May 2022 01:50) (101/64 - 130/64)  BP(mean): 85 (11 May 2022 21:30) (76 - 92)  RR: 18 (12 May 2022 01:50) (16 - 18)  SpO2: 99% (12 May 2022 01:50) (97% - 100%)    Exam:  Gen: NAD, resting in bed, alert and responding appropriately  Resp: Airway patent, non-labored respirations  Abd: Soft, ND, NT, no rebound or guarding. Incisions c/d/i  Ext: No edema, WWP  Neuro: AAOx3, no focal deficits    I&O's Detail    10 May 2022 07:01  -  11 May 2022 07:00  --------------------------------------------------------  IN:    dextrose 5% + sodium chloride 0.45% w/ Additives: 700 mL    Oral Fluid: 1040 mL  Total IN: 1740 mL    OUT:    Voided (mL): 3100 mL  Total OUT: 3100 mL    Total NET: -1360 mL      11 May 2022 07:01  -  12 May 2022 02:15  --------------------------------------------------------  IN:  Total IN: 0 mL    OUT:    Voided (mL): 2075 mL  Total OUT: 2075 mL    Total NET: -2075 mL          Daily Height in cm: 172.72 (11 May 2022 16:12)    Daily     MEDICATIONS  (STANDING):  acetaminophen     Tablet .. 975 milliGRAM(s) Oral every 6 hours  enoxaparin Injectable 40 milliGRAM(s) SubCutaneous every 24 hours  ertapenem  IVPB 1000 milliGRAM(s) IV Intermittent every 24 hours    MEDICATIONS  (PRN):  oxyCODONE    IR 2.5 milliGRAM(s) Oral every 4 hours PRN Moderate Pain (4 - 6)  oxyCODONE    IR 5 milliGRAM(s) Oral every 4 hours PRN Severe Pain (7 - 10)      LABS:                        13.1   5.07  )-----------( 229      ( 11 May 2022 07:52 )             39.8     05-11    141  |  104  |  16  ----------------------------<  106<H>  3.9   |  24  |  1.02    Ca    8.7      11 May 2022 07:52  Phos  4.5     05-11  Mg     1.9     05-11    TPro  6.5  /  Alb  3.9  /  TBili  1.2  /  DBili  x   /  AST  138<H>  /  ALT  409<H>  /  AlkPhos  254<H>  05-11    PT/INR - ( 11 May 2022 07:52 )   PT: 11.8 sec;   INR: 1.02 ratio         PTT - ( 11 May 2022 07:52 )  PTT:33.7 sec    A/P: 51y Male Cholecystitis with choledocholithiasis s/p ERCP with sphincterotomy and stone/sludge/pus removal on 5/9 s/p lap mady 5/12    - f/u AM labs  - DVT ppx  - OOB as tolerated  - pain control   TRAUMA SURGERY DAILY PROGRESS NOTE:    Interval:  No acute events overnight. Patient was post op checked 4 hours after the procedure and was recovering appropriately. Vitals within normal limits    Subjective:  Patient seen and examined. Reports pain, however not well controlled- refusing oxycodone and ibuprofen. Would prefe. Denies N/V. Tolerating clear liquid diet without difficulty.     Vital Signs Last 24 Hrs  T(C): 36.8 (12 May 2022 01:50), Max: 37.2 (11 May 2022 13:35)  T(F): 98.2 (12 May 2022 01:50), Max: 99 (11 May 2022 13:35)  HR: 58 (12 May 2022 01:50) (48 - 66)  BP: 112/63 (12 May 2022 01:50) (101/64 - 130/64)  BP(mean): 85 (11 May 2022 21:30) (76 - 92)  RR: 18 (12 May 2022 01:50) (16 - 18)  SpO2: 99% (12 May 2022 01:50) (97% - 100%)    Exam:  Gen: NAD, resting in bed, alert and responding appropriately  Resp: Airway patent, non-labored respirations  Abd: Soft, ND, NT, no rebound or guarding. Incisions c/d/i  Ext: No edema, WWP  Neuro: AAOx3, no focal deficits    I&O's Detail    10 May 2022 07:01  -  11 May 2022 07:00  --------------------------------------------------------  IN:    dextrose 5% + sodium chloride 0.45% w/ Additives: 700 mL    Oral Fluid: 1040 mL  Total IN: 1740 mL    OUT:    Voided (mL): 3100 mL  Total OUT: 3100 mL    Total NET: -1360 mL      11 May 2022 07:01  -  12 May 2022 02:15  --------------------------------------------------------  IN:  Total IN: 0 mL    OUT:    Voided (mL): 2075 mL  Total OUT: 2075 mL    Total NET: -2075 mL          Daily Height in cm: 172.72 (11 May 2022 16:12)    Daily     MEDICATIONS  (STANDING):  acetaminophen     Tablet .. 975 milliGRAM(s) Oral every 6 hours  enoxaparin Injectable 40 milliGRAM(s) SubCutaneous every 24 hours  ertapenem  IVPB 1000 milliGRAM(s) IV Intermittent every 24 hours    MEDICATIONS  (PRN):  oxyCODONE    IR 2.5 milliGRAM(s) Oral every 4 hours PRN Moderate Pain (4 - 6)  oxyCODONE    IR 5 milliGRAM(s) Oral every 4 hours PRN Severe Pain (7 - 10)      LABS:                        13.1   5.07  )-----------( 229      ( 11 May 2022 07:52 )             39.8     05-11    141  |  104  |  16  ----------------------------<  106<H>  3.9   |  24  |  1.02    Ca    8.7      11 May 2022 07:52  Phos  4.5     05-11  Mg     1.9     05-11    TPro  6.5  /  Alb  3.9  /  TBili  1.2  /  DBili  x   /  AST  138<H>  /  ALT  409<H>  /  AlkPhos  254<H>  05-11    PT/INR - ( 11 May 2022 07:52 )   PT: 11.8 sec;   INR: 1.02 ratio         PTT - ( 11 May 2022 07:52 )  PTT:33.7 sec

## 2022-05-12 NOTE — DISCHARGE NOTE PROVIDER - NSDCMRMEDTOKEN_GEN_ALL_CORE_FT
acetaminophen 325 mg oral tablet: 3 tab(s) orally every 6 hours, As Needed - for mild pain  oxyCODONE 5 mg oral tablet: 1 tab(s) orally prn, As Needed  - 6) MDD:MDD: 4 tabs. take 1 tab every 6hours as needed

## 2022-05-12 NOTE — PROGRESS NOTE ADULT - ASSESSMENT
A/P: 51y Male Cholecystitis with choledocholithiasis s/p ERCP with sphincterotomy and stone/sludge/pus removal on 5/9 s/p lap mady 5/12    - Diet: Regular diet   - f/u AM labs  - DVT ppx  - OOB as tolerated  - Pain control  - Dispo: Home today vs devin    [id number]  Trauma Surgery

## 2022-05-12 NOTE — DISCHARGE NOTE PROVIDER - CARE PROVIDER_API CALL
Jason Chavez)  Surgery; Surgical Critical Care  [address] [facility], [address]  [place] [address]  Phone: [phone]  Fax: [phone]  Follow Up Time: 1 week   Jason Chavez)  Surgery; Surgical Critical Care  [address] [place] [address], [address]  [place] [address]  Phone: [phone]  Fax: [phone]  Follow Up Time: 1 week    Davi Pena)  Gastroenterology; Internal Medicine  Division of Gastroenterology - [address] [place] [id number]  Phone: [phone]  Fax: [phone]  Follow Up Time: 1 week

## 2022-05-12 NOTE — DISCHARGE NOTE PROVIDER - NSDCCPTREATMENT_GEN_ALL_CORE_FT
PRINCIPAL PROCEDURE  Procedure: Laparoscopic cholecystectomy  Findings and Treatment: recover from surgery - outpatient follow up

## 2022-05-12 NOTE — DISCHARGE NOTE PROVIDER - NSDCCPCAREPLAN_GEN_ALL_CORE_FT
PRINCIPAL DISCHARGE DIAGNOSIS  Diagnosis: Cholecystitis  Assessment and Plan of Treatment: WOUND CARE: Leave steri strips in place until they come off on their own.  Please pat area dry.   BATHING: Please do not submerge wound underwater. You may shower and/or sponge bathe. Please pat wound dry after showering.    ACTIVITY: No heavy lifting anything more than 10-15lbs or straining. Otherwise, you may return to your usual level of physical activity. If you are taking narcotic pain medication (such as oxycodone or Percocet), do NOT drive a car, operate machinery or make important decisions.  NOTIFY YOUR SURGEON IF: You have any excessive bleeding or pus draining from your wound, any fever (over 100.4 F) or chills, persistent nausea/vomiting with inability to tolerate food or liquids, persistent diarrhea, or if your pain is not controlled on your discharge pain medications.  FOLLOW-UP:  1. Please call to make a follow-up appointment with Dr. Hendrickson in 1-2 weeks upon discharge from the hospital - Please call immediately upon discharge to schedule the appointment  2. Please follow up with your primary care physician in one week regarding your hospitalization.

## 2022-05-12 NOTE — DISCHARGE NOTE PROVIDER - CARE PROVIDERS DIRECT ADDRESSES
,DirectAddress_Unknown ,DirectAddress_Unknown,[email or web address]

## 2022-05-13 ENCOUNTER — TRANSCRIPTION ENCOUNTER (OUTPATIENT)
Age: 52
End: 2022-05-13

## 2022-05-13 VITALS
RESPIRATION RATE: 18 BRPM | DIASTOLIC BLOOD PRESSURE: 66 MMHG | OXYGEN SATURATION: 99 % | HEART RATE: 64 BPM | TEMPERATURE: 99 F | SYSTOLIC BLOOD PRESSURE: 111 MMHG

## 2022-05-13 LAB
ALBUMIN SERPL ELPH-MCNC: 3.6 G/DL — SIGNIFICANT CHANGE UP (ref 3.3–5)
ALP SERPL-CCNC: 204 U/L — HIGH (ref 40–120)
ALT FLD-CCNC: 364 U/L — HIGH (ref 10–45)
ANION GAP SERPL CALC-SCNC: 12 MMOL/L — SIGNIFICANT CHANGE UP (ref 5–17)
AST SERPL-CCNC: 103 U/L — HIGH (ref 10–40)
BILIRUB SERPL-MCNC: 0.9 MG/DL — SIGNIFICANT CHANGE UP (ref 0.2–1.2)
BUN SERPL-MCNC: 12 MG/DL — SIGNIFICANT CHANGE UP (ref 7–23)
CALCIUM SERPL-MCNC: 9.2 MG/DL — SIGNIFICANT CHANGE UP (ref 8.4–10.5)
CHLORIDE SERPL-SCNC: 105 MMOL/L — SIGNIFICANT CHANGE UP (ref 96–108)
CO2 SERPL-SCNC: 22 MMOL/L — SIGNIFICANT CHANGE UP (ref 22–31)
CREAT SERPL-MCNC: 1.07 MG/DL — SIGNIFICANT CHANGE UP (ref 0.5–1.3)
EGFR: 84 ML/MIN/1.73M2 — SIGNIFICANT CHANGE UP
GLUCOSE SERPL-MCNC: 105 MG/DL — HIGH (ref 70–99)
HCT VFR BLD CALC: 42.6 % — SIGNIFICANT CHANGE UP (ref 39–50)
HGB BLD-MCNC: 13.7 G/DL — SIGNIFICANT CHANGE UP (ref 13–17)
MAGNESIUM SERPL-MCNC: 2 MG/DL — SIGNIFICANT CHANGE UP (ref 1.6–2.6)
MCHC RBC-ENTMCNC: 30.6 PG — SIGNIFICANT CHANGE UP (ref 27–34)
MCHC RBC-ENTMCNC: 32.2 GM/DL — SIGNIFICANT CHANGE UP (ref 32–36)
MCV RBC AUTO: 95.1 FL — SIGNIFICANT CHANGE UP (ref 80–100)
NRBC # BLD: 0 /100 WBCS — SIGNIFICANT CHANGE UP (ref 0–0)
PHOSPHATE SERPL-MCNC: 3.3 MG/DL — SIGNIFICANT CHANGE UP (ref 2.5–4.5)
PLATELET # BLD AUTO: 256 K/UL — SIGNIFICANT CHANGE UP (ref 150–400)
POTASSIUM SERPL-MCNC: 4.4 MMOL/L — SIGNIFICANT CHANGE UP (ref 3.5–5.3)
POTASSIUM SERPL-SCNC: 4.4 MMOL/L — SIGNIFICANT CHANGE UP (ref 3.5–5.3)
PROT SERPL-MCNC: 6.9 G/DL — SIGNIFICANT CHANGE UP (ref 6–8.3)
RBC # BLD: 4.48 M/UL — SIGNIFICANT CHANGE UP (ref 4.2–5.8)
RBC # FLD: 14.6 % — HIGH (ref 10.3–14.5)
SODIUM SERPL-SCNC: 139 MMOL/L — SIGNIFICANT CHANGE UP (ref 135–145)
WBC # BLD: 8.2 K/UL — SIGNIFICANT CHANGE UP (ref 3.8–10.5)
WBC # FLD AUTO: 8.2 K/UL — SIGNIFICANT CHANGE UP (ref 3.8–10.5)

## 2022-05-13 RX ORDER — OXYCODONE HYDROCHLORIDE 5 MG/1
1 TABLET ORAL
Qty: 6 | Refills: 0
Start: 2022-05-13

## 2022-05-13 RX ORDER — SIMETHICONE 80 MG/1
80 TABLET, CHEWABLE ORAL ONCE
Refills: 0 | Status: COMPLETED | OUTPATIENT
Start: 2022-05-13 | End: 2022-05-13

## 2022-05-13 RX ORDER — OXYCODONE HYDROCHLORIDE 5 MG/1
1 TABLET ORAL
Qty: 6 | Refills: 0
Start: 2022-05-13 | End: 2022-05-18

## 2022-05-13 RX ORDER — ACETAMINOPHEN 500 MG
3 TABLET ORAL
Qty: 0 | Refills: 0 | DISCHARGE
Start: 2022-05-13

## 2022-05-13 RX ADMIN — SIMETHICONE 80 MILLIGRAM(S): 80 TABLET, CHEWABLE ORAL at 14:47

## 2022-05-13 RX ADMIN — Medication 975 MILLIGRAM(S): at 06:10

## 2022-05-13 RX ADMIN — Medication 975 MILLIGRAM(S): at 05:13

## 2022-05-13 RX ADMIN — Medication 975 MILLIGRAM(S): at 00:02

## 2022-05-13 RX ADMIN — Medication 975 MILLIGRAM(S): at 12:05

## 2022-05-13 NOTE — PROGRESS NOTE ADULT - PROVIDER SPECIALTY LIST ADULT
Trauma Surgery
Gastroenterology
Trauma Surgery
Trauma Surgery

## 2022-05-13 NOTE — PROGRESS NOTE ADULT - SUBJECTIVE AND OBJECTIVE BOX
GENERAL SURGERY DAILY PROGRESS NOTE:    Interval:  No acute events overnight.    Subjective:  Patient seen and examined. Reports pain is well controlled. Denies N/V.    Vital Signs Last 24 Hrs  T(C): 37.4 (13 May 2022 01:19), Max: 37.7 (12 May 2022 13:19)  T(F): 99.3 (13 May 2022 01:19), Max: 99.9 (12 May 2022 13:19)  HR: 56 (13 May 2022 01:19) (55 - 61)  BP: 110/63 (13 May 2022 01:19) (110/63 - 128/70)  BP(mean): --  RR: 18 (13 May 2022 01:19) (18 - 18)  SpO2: 97% (13 May 2022 01:19) (96% - 99%)    Exam:  Gen: NAD, resting in bed, alert and responding appropriately  Resp: Airway patent, non-labored respirations  Abd: Soft, ND, NT, no rebound or guarding. Incisions c/d/i  Ext: No edema, WWP  Neuro: AAOx3, no focal deficits    I&O's Detail    11 May 2022 07:01  -  12 May 2022 07:00  --------------------------------------------------------  IN:    IV PiggyBack: 50 mL  Total IN: 50 mL    OUT:    Voided (mL): 2975 mL  Total OUT: 2975 mL    Total NET: -2925 mL      12 May 2022 07:01  -  13 May 2022 01:51  --------------------------------------------------------  IN:    Oral Fluid: 1900 mL  Total IN: 1900 mL    OUT:    Voided (mL): 2150 mL  Total OUT: 2150 mL    Total NET: -250 mL          Daily     Daily     MEDICATIONS  (STANDING):  acetaminophen     Tablet .. 975 milliGRAM(s) Oral every 6 hours  enoxaparin Injectable 40 milliGRAM(s) SubCutaneous every 24 hours    MEDICATIONS  (PRN):  oxyCODONE    IR 5 milliGRAM(s) Oral every 4 hours PRN Moderate Pain (4 - 6)  oxyCODONE    IR 10 milliGRAM(s) Oral every 4 hours PRN Severe Pain (7 - 10)      LABS:                        13.0   7.62  )-----------( 227      ( 12 May 2022 07:23 )             40.0     05-12    140  |  102  |  10  ----------------------------<  94  3.8   |  25  |  1.12    Ca    9.0      12 May 2022 07:23  Phos  4.8     05-12  Mg     1.8     05-12    TPro  6.4  /  Alb  3.7  /  TBili  1.2  /  DBili  x   /  AST  189<H>  /  ALT  434<H>  /  AlkPhos  218<H>  05-12    PT/INR - ( 11 May 2022 07:52 )   PT: 11.8 sec;   INR: 1.02 ratio         PTT - ( 11 May 2022 07:52 )  PTT:33.7 sec       TRAUMA SURGERY DAILY PROGRESS NOTE:    Interval:  No acute events overnight.    Subjective:  Patient seen and examined. Reports pain is well controlled. Reports he did get out of bed yesterday. Tolerating diet, denies nausea.vomiting.     Vital Signs Last 24 Hrs  T(C): 37.4 (13 May 2022 01:19), Max: 37.7 (12 May 2022 13:19)  T(F): 99.3 (13 May 2022 01:19), Max: 99.9 (12 May 2022 13:19)  HR: 56 (13 May 2022 01:19) (55 - 61)  BP: 110/63 (13 May 2022 01:19) (110/63 - 128/70)  BP(mean): --  RR: 18 (13 May 2022 01:19) (18 - 18)  SpO2: 97% (13 May 2022 01:19) (96% - 99%)    Exam:  Gen: NAD, resting in bed, alert and responding appropriately  Resp: Airway patent, non-labored respirations  Abd: Soft, ND, NT, no rebound or guarding. Incisions c/d/i  Ext: No edema, WWP  Neuro: AAOx3, no focal deficits    I&O's Detail    11 May 2022 07:01  -  12 May 2022 07:00  --------------------------------------------------------  IN:    IV PiggyBack: 50 mL  Total IN: 50 mL    OUT:    Voided (mL): 2975 mL  Total OUT: 2975 mL    Total NET: -2925 mL      12 May 2022 07:01  -  13 May 2022 01:51  --------------------------------------------------------  IN:    Oral Fluid: 1900 mL  Total IN: 1900 mL    OUT:    Voided (mL): 2150 mL  Total OUT: 2150 mL  Total NET: -250 mL      MEDICATIONS  (STANDING):  acetaminophen     Tablet .. 975 milliGRAM(s) Oral every 6 hours  enoxaparin Injectable 40 milliGRAM(s) SubCutaneous every 24 hours    MEDICATIONS  (PRN):  oxyCODONE    IR 5 milliGRAM(s) Oral every 4 hours PRN Moderate Pain (4 - 6)  oxyCODONE    IR 10 milliGRAM(s) Oral every 4 hours PRN Severe Pain (7 - 10)      LABS:                        13.0   7.62  )-----------( 227      ( 12 May 2022 07:23 )             40.0     05-12    140  |  102  |  10  ----------------------------<  94  3.8   |  25  |  1.12    Ca    9.0      12 May 2022 07:23  Phos  4.8     05-12  Mg     1.8     05-12    TPro  6.4  /  Alb  3.7  /  TBili  1.2  /  DBili  x   /  AST  189<H>  /  ALT  434<H>  /  AlkPhos  218<H>  05-12  PT/INR - ( 11 May 2022 07:52 )   PT: 11.8 sec;   INR: 1.02 ratio    PTT - ( 11 May 2022 07:52 )  PTT:33.7 sec

## 2022-05-13 NOTE — DISCHARGE NOTE NURSING/CASE MANAGEMENT/SOCIAL WORK - NSDCPEFALRISK_GEN_ALL_CORE
For information on Fall & Injury Prevention, visit: [email or web address] OR  [email or web address] OR  https://www.cdc.gov/steadi/patient.html

## 2022-05-13 NOTE — PROGRESS NOTE ADULT - ASSESSMENT
A/P: 51y Male Cholecystitis with choledocholithiasis s/p ERCP with sphincterotomy and stone/sludge/pus removal on 5/9 s/p lap mady 5/12    - Diet: Regular diet   - f/u AM labs  - DVT ppx  - OOB as tolerated  - Pain control  - Dispo: Home today vs devin    x9012  Trauma Surgery  A/P: 51y Male Cholecystitis with choledocholithiasis s/p ERCP with sphincterotomy and stone/sludge/pus removal on 5/9 s/p lap mady 5/12, recovering well.     - Diet: Regular diet   - f/u AM labs  - DVT ppx  - OOB as tolerated  - Pain control  - Dispo: Home today vs devin    x9015  Trauma Surgery

## 2022-05-13 NOTE — PROGRESS NOTE ADULT - REASON FOR ADMISSION
Clinical acute cholecystitis w/ choledocholithiasis

## 2022-05-13 NOTE — DISCHARGE NOTE NURSING/CASE MANAGEMENT/SOCIAL WORK - PATIENT PORTAL LINK FT
You can access the FollowMyHealth Patient Portal offered by [facility] by registering at the following website: [email or web address]. By joining [facility]’s FollowMyHealth portal, you will also be able to view your health information using other applications (apps) compatible with our system.

## 2022-05-18 PROBLEM — Z00.00 ENCOUNTER FOR PREVENTIVE HEALTH EXAMINATION: Noted: 2022-05-18

## 2022-05-25 ENCOUNTER — APPOINTMENT (OUTPATIENT)
Dept: TRAUMA SURGERY | Facility: CLINIC | Age: 52
End: 2022-05-25

## 2022-05-25 VITALS
HEIGHT: 68 IN | WEIGHT: 175 LBS | DIASTOLIC BLOOD PRESSURE: 75 MMHG | HEART RATE: 61 BPM | BODY MASS INDEX: 26.52 KG/M2 | SYSTOLIC BLOOD PRESSURE: 111 MMHG

## 2022-05-25 LAB — SURGICAL PATHOLOGY STUDY: SIGNIFICANT CHANGE UP

## 2022-05-30 PROCEDURE — 85025 COMPLETE CBC W/AUTO DIFF WBC: CPT

## 2022-05-30 PROCEDURE — 85014 HEMATOCRIT: CPT

## 2022-05-30 PROCEDURE — 83690 ASSAY OF LIPASE: CPT

## 2022-05-30 PROCEDURE — 82435 ASSAY OF BLOOD CHLORIDE: CPT

## 2022-05-30 PROCEDURE — A9585: CPT

## 2022-05-30 PROCEDURE — 76705 ECHO EXAM OF ABDOMEN: CPT

## 2022-05-30 PROCEDURE — 83605 ASSAY OF LACTIC ACID: CPT

## 2022-05-30 PROCEDURE — C9399: CPT

## 2022-05-30 PROCEDURE — 36415 COLL VENOUS BLD VENIPUNCTURE: CPT

## 2022-05-30 PROCEDURE — 86901 BLOOD TYPING SEROLOGIC RH(D): CPT

## 2022-05-30 PROCEDURE — 80053 COMPREHEN METABOLIC PANEL: CPT

## 2022-05-30 PROCEDURE — 87637 SARSCOV2&INF A&B&RSV AMP PRB: CPT

## 2022-05-30 PROCEDURE — 96374 THER/PROPH/DIAG INJ IV PUSH: CPT

## 2022-05-30 PROCEDURE — 85027 COMPLETE CBC AUTOMATED: CPT

## 2022-05-30 PROCEDURE — 88305 TISSUE EXAM BY PATHOLOGIST: CPT

## 2022-05-30 PROCEDURE — 82330 ASSAY OF CALCIUM: CPT

## 2022-05-30 PROCEDURE — 84132 ASSAY OF SERUM POTASSIUM: CPT

## 2022-05-30 PROCEDURE — 85018 HEMOGLOBIN: CPT

## 2022-05-30 PROCEDURE — 84295 ASSAY OF SERUM SODIUM: CPT

## 2022-05-30 PROCEDURE — C1889: CPT

## 2022-05-30 PROCEDURE — 74183 MRI ABD W/O CNTR FLWD CNTR: CPT | Mod: MG

## 2022-05-30 PROCEDURE — 86850 RBC ANTIBODY SCREEN: CPT

## 2022-05-30 PROCEDURE — 82803 BLOOD GASES ANY COMBINATION: CPT

## 2022-05-30 PROCEDURE — 86900 BLOOD TYPING SEROLOGIC ABO: CPT

## 2022-05-30 PROCEDURE — 85730 THROMBOPLASTIN TIME PARTIAL: CPT

## 2022-05-30 PROCEDURE — 88304 TISSUE EXAM BY PATHOLOGIST: CPT

## 2022-05-30 PROCEDURE — G1004: CPT

## 2022-05-30 PROCEDURE — 83735 ASSAY OF MAGNESIUM: CPT

## 2022-05-30 PROCEDURE — 85610 PROTHROMBIN TIME: CPT

## 2022-05-30 PROCEDURE — 82947 ASSAY GLUCOSE BLOOD QUANT: CPT

## 2022-05-30 PROCEDURE — C1769: CPT

## 2022-05-30 PROCEDURE — 84100 ASSAY OF PHOSPHORUS: CPT

## 2022-05-30 PROCEDURE — 99285 EMERGENCY DEPT VISIT HI MDM: CPT | Mod: 25

## 2022-06-13 ENCOUNTER — APPOINTMENT (OUTPATIENT)
Dept: SURGERY | Facility: HOSPITAL | Age: 52
End: 2022-06-13

## 2022-06-20 ENCOUNTER — APPOINTMENT (OUTPATIENT)
Dept: TRAUMA SURGERY | Facility: CLINIC | Age: 52
End: 2022-06-20

## 2022-06-28 ENCOUNTER — APPOINTMENT (OUTPATIENT)
Dept: GASTROENTEROLOGY | Facility: CLINIC | Age: 52
End: 2022-06-28

## 2022-06-28 VITALS
WEIGHT: 182 LBS | HEART RATE: 67 BPM | HEIGHT: 68 IN | SYSTOLIC BLOOD PRESSURE: 122 MMHG | BODY MASS INDEX: 27.58 KG/M2 | DIASTOLIC BLOOD PRESSURE: 81 MMHG | OXYGEN SATURATION: 100 %

## 2022-06-28 DIAGNOSIS — R94.5 ABNORMAL RESULTS OF LIVER FUNCTION STUDIES: ICD-10-CM

## 2022-06-28 DIAGNOSIS — Z78.9 OTHER SPECIFIED HEALTH STATUS: ICD-10-CM

## 2022-06-28 DIAGNOSIS — K80.50 CALCULUS OF BILE DUCT W/OUT CHOLANGITIS OR CHOLECYSTITIS W/OUT OBSTRUCTION: ICD-10-CM

## 2022-06-28 LAB
ALBUMIN SERPL ELPH-MCNC: 4.6 G/DL
ALP BLD-CCNC: 90 U/L
ALT SERPL-CCNC: 27 U/L
AST SERPL-CCNC: 17 U/L
BILIRUB DIRECT SERPL-MCNC: 0.1 MG/DL
BILIRUB INDIRECT SERPL-MCNC: 0.2 MG/DL
BILIRUB SERPL-MCNC: 0.4 MG/DL
PROT SERPL-MCNC: 7.3 G/DL

## 2022-06-28 PROCEDURE — 99213 OFFICE O/P EST LOW 20 MIN: CPT

## 2022-07-04 PROBLEM — Z78.9 NEVER SMOKED TOBACCO: Status: ACTIVE | Noted: 2022-07-04

## 2022-07-04 PROBLEM — K80.50 CHOLEDOCHOLITHIASIS: Status: RESOLVED | Noted: 2022-07-04 | Resolved: 2022-07-04

## 2022-07-04 NOTE — ASSESSMENT
[FreeTextEntry1] : Impression:\par \par Patient with cholelithiasis, choledocholithiasis, status post ERCP with removal of bile duct stones and subsequent cholecystectomy.  Had abnormal LFTs, improved but not resolved at time of discharge.\par \par Recommendations:\par \par #1.  Check LFTs\par \par #2.  Follow-up with surgeon as necessary.

## 2022-07-04 NOTE — HISTORY OF PRESENT ILLNESS
[FreeTextEntry1] : Patient hospitalized in early May for acute cholecystitis and choledocholithiasis with abnormal LFTs.  Had ERCP with extraction of choledocholithiasis and cholecystectomy.\par \par His abdominal pain has resolved.  No fevers, chills, sweats, nausea, vomiting, melena, jaundice, weight loss.\par \par Abnormal LFTs normalized but not resolved\par discharge bili 0.9, , , \par \par ERCP 5/9/22:\par Findings:\par      The  film was normal. A standard esophagogastroduodenoscopy scope was used for the \par      examination of the upper gastrointestinal tract. The scope was passed under direct vision \par      through the upper GI tract. Patchy mild inflammation characterized by congestion (edema), \par      erythema and granularity was found in the gastric body. Stomach biopsies were taken for \par      histology. The major papilla was normal with prominent bulge noted likely suggesting a stone. \par      The bile duct was deeply cannulated with the Rx44 sphincterotome with a preloaded wire. \par      Contrast was injected. I personally interpreted the bile duct images. Ductal flow of contrast \par      was adequate. The biliary tree was otherwise normal. The lower third of the main bile duct \par      contained one stone, which was 8 mm in diameter. Biliary sphincterotomy was made with the \par      sphincterotome using ERBE cautery. There was no bleeding. The biliary tree was swept with an \par      8.5 and 11 mm balloon starting at the upper third of the main bile duct. Sludge and some pus \par      was swept from the duct. One stone was removed. No stones remained. Final occlusion \par      cholangiogram showed no further stones or debris. Final fluoroscopic images demonstrated no \par      air in unusual places.\par                                                                                                     \par Impression:          EGD:\par                      - Erosive gastritis. Biopsied for H. pylori.\par                      ERCP:\par                      - Cholangitis (small amount of pus flowed from bile duct)\par                      - Choledocholithiasis was found. Complete removal was accomplished by biliary \par                      sphincterotomy and balloon extraction.\par \par Pathology:\par \par 1. Gastric, biopsy\par - Mild chronic inactive gastritis.\par - Negative for intestinal metaplasia.\par - No morphologic evidence of H. pylori.\par \par Pathology:\par \par Final Diagnosis\par 1. Gallbladder, cholecystectomy:\par - Acute and chronic cholecystitis with ulceration, abscess formation\par and foreign body giant cell reaction\par - Cholelithiasis\par \par \par \par

## 2022-07-04 NOTE — PHYSICAL EXAM
[General Appearance - Alert] : alert [General Appearance - In No Acute Distress] : in no acute distress [Outer Ear] : the ears and nose were normal in appearance [Neck Appearance] : the appearance of the neck was normal [] : no respiratory distress [Auscultation Breath Sounds / Voice Sounds] : lungs were clear to auscultation bilaterally [Heart Sounds] : normal S1 and S2 [Edema] : there was no peripheral edema [Bowel Sounds] : normal bowel sounds [Abdomen Soft] : soft [Abdomen Tenderness] : non-tender [Abnormal Walk] : normal gait [Skin Color & Pigmentation] : normal skin color and pigmentation [No Focal Deficits] : no focal deficits [Affect] : the affect was normal [Mood] : the mood was normal [FreeTextEntry1] : Surgical sites from lap mady dry

## 2022-07-04 NOTE — CONSULT LETTER
[Dear  ___] : Dear  [unfilled], [Consult Letter:] : I had the pleasure of evaluating your patient, [unfilled]. [Please see my note below.] : Please see my note below. [Consult Closing:] : Thank you very much for allowing me to participate in the care of this patient.  If you have any questions, please do not hesitate to contact me. [Sincerely,] : Sincerely, [FreeTextEntry3] : Davi Pena MD, MPH, MAURICIO, RYLEE\par Chief of Clinical Quality in Gastroenterology, [facility]\par Associate Chief of Gastroenterology, [facility]\par Interim Director of Endoscopy, [facility]\par Director of Endoscopic Ultrasound, [facility]\par [address], [address]\par [facility], [address]\par 24 hours [phone]\par \par

## 2022-10-10 NOTE — ED ADULT NURSE NOTE - WILL THE PATIENT ACCEPT THE PFIZER COVID-19 VACCINE IF ELIGIBLE AND IT IS AVAILABLE?
Not applicable Libtayo Pregnancy And Lactation Text: This medication is contraindicated in pregnancy and when breast feeding.

## 2024-02-22 PROBLEM — K80.20 CALCULUS OF GALLBLADDER WITHOUT CHOLECYSTITIS WITHOUT OBSTRUCTION: Chronic | Status: ACTIVE | Noted: 2022-03-08

## 2024-08-05 ENCOUNTER — APPOINTMENT (OUTPATIENT)
Dept: NEUROLOGY | Facility: CLINIC | Age: 54
End: 2024-08-05

## 2024-12-21 ENCOUNTER — APPOINTMENT (OUTPATIENT)
Dept: CARDIOLOGY | Facility: CLINIC | Age: 54
End: 2024-12-21

## 2024-12-21 ENCOUNTER — NON-APPOINTMENT (OUTPATIENT)
Age: 54
End: 2024-12-21

## 2024-12-21 VITALS
RESPIRATION RATE: 16 BRPM | DIASTOLIC BLOOD PRESSURE: 87 MMHG | HEIGHT: 68 IN | BODY MASS INDEX: 30.01 KG/M2 | SYSTOLIC BLOOD PRESSURE: 160 MMHG | HEART RATE: 61 BPM | OXYGEN SATURATION: 98 % | WEIGHT: 198 LBS

## 2024-12-21 PROCEDURE — 99204 OFFICE O/P NEW MOD 45 MIN: CPT

## 2024-12-21 PROCEDURE — G2211 COMPLEX E/M VISIT ADD ON: CPT | Mod: NC

## 2024-12-21 PROCEDURE — 93000 ELECTROCARDIOGRAM COMPLETE: CPT

## 2024-12-21 RX ORDER — OMEPRAZOLE 40 MG/1
40 CAPSULE, DELAYED RELEASE ORAL DAILY
Refills: 0 | Status: ACTIVE | COMMUNITY

## 2024-12-21 RX ORDER — SUCRALFATE 1 G/1
1 TABLET ORAL
Refills: 0 | Status: ACTIVE | COMMUNITY

## 2025-01-02 DIAGNOSIS — R94.31 ABNORMAL ELECTROCARDIOGRAM [ECG] [EKG]: ICD-10-CM

## 2025-01-02 DIAGNOSIS — R06.00 DYSPNEA, UNSPECIFIED: ICD-10-CM

## 2025-01-02 DIAGNOSIS — R07.89 OTHER CHEST PAIN: ICD-10-CM

## 2025-01-03 ENCOUNTER — APPOINTMENT (OUTPATIENT)
Dept: CARDIOLOGY | Facility: CLINIC | Age: 55
End: 2025-01-03

## 2025-01-03 VITALS
SYSTOLIC BLOOD PRESSURE: 139 MMHG | OXYGEN SATURATION: 97 % | DIASTOLIC BLOOD PRESSURE: 77 MMHG | RESPIRATION RATE: 18 BRPM | HEART RATE: 63 BPM

## 2025-01-03 PROCEDURE — 93015 CV STRESS TEST SUPVJ I&R: CPT

## 2025-01-03 PROCEDURE — ZZZZZ: CPT

## 2025-01-03 PROCEDURE — 99214 OFFICE O/P EST MOD 30 MIN: CPT | Mod: 25

## 2025-01-03 PROCEDURE — 93306 TTE W/DOPPLER COMPLETE: CPT
